# Patient Record
Sex: FEMALE | Race: WHITE | NOT HISPANIC OR LATINO | URBAN - METROPOLITAN AREA
[De-identification: names, ages, dates, MRNs, and addresses within clinical notes are randomized per-mention and may not be internally consistent; named-entity substitution may affect disease eponyms.]

---

## 2017-07-05 ENCOUNTER — APPOINTMENT (RX ONLY)
Dept: URBAN - METROPOLITAN AREA CLINIC 349 | Facility: CLINIC | Age: 52
Setting detail: DERMATOLOGY
End: 2017-07-05

## 2017-07-05 DIAGNOSIS — Q826 OTHER SPECIFIED ANOMALIES OF SKIN: ICD-10-CM

## 2017-07-05 DIAGNOSIS — Q819 OTHER SPECIFIED ANOMALIES OF SKIN: ICD-10-CM

## 2017-07-05 DIAGNOSIS — L82.1 OTHER SEBORRHEIC KERATOSIS: ICD-10-CM

## 2017-07-05 DIAGNOSIS — Q828 OTHER SPECIFIED ANOMALIES OF SKIN: ICD-10-CM

## 2017-07-05 DIAGNOSIS — L81.4 OTHER MELANIN HYPERPIGMENTATION: ICD-10-CM

## 2017-07-05 DIAGNOSIS — D22 MELANOCYTIC NEVI: ICD-10-CM

## 2017-07-05 DIAGNOSIS — L23.9 ALLERGIC CONTACT DERMATITIS, UNSPECIFIED CAUSE: ICD-10-CM

## 2017-07-05 PROBLEM — L85.3 XEROSIS CUTIS: Status: ACTIVE | Noted: 2017-07-05

## 2017-07-05 PROBLEM — E78.5 HYPERLIPIDEMIA, UNSPECIFIED: Status: ACTIVE | Noted: 2017-07-05

## 2017-07-05 PROBLEM — J30.1 ALLERGIC RHINITIS DUE TO POLLEN: Status: ACTIVE | Noted: 2017-07-05

## 2017-07-05 PROBLEM — L23.7 ALLERGIC CONTACT DERMATITIS DUE TO PLANTS, EXCEPT FOOD: Status: ACTIVE | Noted: 2017-07-05

## 2017-07-05 PROBLEM — Q82.8 OTHER SPECIFIED CONGENITAL MALFORMATIONS OF SKIN: Status: ACTIVE | Noted: 2017-07-05

## 2017-07-05 PROBLEM — D22.62 MELANOCYTIC NEVI OF LEFT UPPER LIMB, INCLUDING SHOULDER: Status: ACTIVE | Noted: 2017-07-05

## 2017-07-05 PROCEDURE — ? COUNSELING

## 2017-07-05 PROCEDURE — ? PRESCRIPTION

## 2017-07-05 PROCEDURE — 99242 OFF/OP CONSLTJ NEW/EST SF 20: CPT

## 2017-07-05 RX ORDER — FLUTICASONE PROPIONATE 0.05 %
CREAM (GRAM) TOPICAL
Qty: 1 | Refills: 3 | Status: ERX | COMMUNITY
Start: 2017-07-05

## 2017-07-05 RX ADMIN — Medication: at 00:00

## 2017-07-05 ASSESSMENT — LOCATION ZONE DERM
LOCATION ZONE: ARM
LOCATION ZONE: HAND
LOCATION ZONE: LEG
LOCATION ZONE: FINGER

## 2017-07-05 ASSESSMENT — LOCATION SIMPLE DESCRIPTION DERM
LOCATION SIMPLE: LEFT POSTERIOR UPPER ARM
LOCATION SIMPLE: LEFT UPPER ARM
LOCATION SIMPLE: RIGHT PRETIBIAL REGION
LOCATION SIMPLE: LEFT MIDDLE FINGER
LOCATION SIMPLE: LEFT HAND
LOCATION SIMPLE: RIGHT SHOULDER
LOCATION SIMPLE: RIGHT HAND

## 2017-07-05 ASSESSMENT — LOCATION DETAILED DESCRIPTION DERM
LOCATION DETAILED: LEFT ANTERIOR PROXIMAL UPPER ARM
LOCATION DETAILED: RIGHT RADIAL DORSAL HAND
LOCATION DETAILED: RIGHT LATERAL PROXIMAL PRETIBIAL REGION
LOCATION DETAILED: LEFT RADIAL DORSAL HAND
LOCATION DETAILED: LEFT PROXIMAL POSTERIOR UPPER ARM
LOCATION DETAILED: LEFT PROXIMAL DORSAL MIDDLE FINGER
LOCATION DETAILED: RIGHT POSTERIOR SHOULDER

## 2017-07-22 ENCOUNTER — HOSPITAL ENCOUNTER (OUTPATIENT)
Dept: MAMMOGRAPHY | Age: 52
Discharge: HOME | End: 2017-07-22
Attending: FAMILY MEDICINE
Payer: COMMERCIAL

## 2017-07-22 DIAGNOSIS — Z12.31 VISIT FOR SCREENING MAMMOGRAM: ICD-10-CM

## 2017-07-22 PROCEDURE — 77067 SCR MAMMO BI INCL CAD: CPT

## 2018-03-30 ENCOUNTER — HOSPITAL ENCOUNTER (OUTPATIENT)
Dept: PHYSICAL THERAPY | Age: 53
Discharge: HOME | End: 2018-03-30
Attending: FAMILY MEDICINE
Payer: COMMERCIAL

## 2018-03-30 DIAGNOSIS — G57.02 SCIATIC NEUROPATHY, LEFT: ICD-10-CM

## 2018-03-30 DIAGNOSIS — M54.2 NECK PAIN: ICD-10-CM

## 2018-03-30 PROCEDURE — 97140 MANUAL THERAPY 1/> REGIONS: CPT

## 2018-03-30 PROCEDURE — 97161 PT EVAL LOW COMPLEX 20 MIN: CPT

## 2018-03-30 NOTE — PROGRESS NOTES
Ambulatory/Rehab Services H2 Model Falls Risk Assessment    Risk Factor Pts. ·   Confusion/Disorientation/Impulsivity  []    4 ·   Symptomatic Depression  []   2 ·   Altered Elimination  []   1 ·   Dizziness/Vertigo  []   1 ·   Gender (Male)  []   1 ·   Any administered antiepileptics (anticonvulsants):  []   2 ·   Any administered benzodiazepines:  []   1 ·   Visual Impairment (specify):  []   1 ·   Portable Oxygen Use  []   1 ·   Orthostatic ? BP  []   1 ·   History of Recent Falls (within 3 mos.)  []   5     Ability to Rise from Chair (choose one) Pts. ·   Ability to rise in a single movement  []   0 ·   Pushes up, successful in one attempt  [x]   1 ·   Multiple attempts, but successful  []   3 ·   Unable to rise without assistance  []   4   Total: (5 or greater = High Risk) 1     Falls Prevention Plan:   []                Physical Limitations to Exercise (specify):   []                Mobility Assistance Device (type):   []                Exercise/Equipment Adaptation (specify):    ©2010 [facility] [address] Patent #[id number].  Federal Law prohibits the replication, distribution or use without written permission from [facility] [employer]

## 2018-03-30 NOTE — THERAPY EVALUATION
Donald Pace  : 1965  Payor: Mami Baker / [place]: [place] BLUE CR[place] OF [address] / Product Type: PPO /    [address] [facility], [address], [place], [id number] [place] [address] Road  [phone]   [phone]         OUTPATIENT PHYSICAL THERAPY:Initial Assessment 3/30/2018      ICD-10: Treatment Diagnosis: Pain in left hip (M25.552); difficulty walking (R26.2)  Left shoulder pain (M25.512)    Precautions/Allergies:   Codeine and Percocet [oxycodone-acetaminophen]   Fall Risk Score: 1 (? 5 = High Risk)  MD Orders: evaluate and treat MEDICAL/REFERRING DIAGNOSIS:  Sciatic neuropathy, left [G57.02]  Neck pain [M54.2]   DATE OF ONSET: 2018  REFERRING PHYSICIAN: Patrick Kelly MD  RETURN PHYSICIAN APPOINTMENT: 18     INITIAL ASSESSMENT:  Ms. Susanne Hollis presents with functional limitations due to left shoulder and hip pain. PT evaluation completed today revealing signs and symptoms consistent with left shoulder impingement, muscle imbalances of upper and lower extremities causing hip and shoulder restriction, as well as leg length discrepancy. Pt would highly benefit from skilled PT to reduce pain and improve overall functional mobility     PROBLEM LIST (Impacting functional limitations):  1. Decreased Strength  2. Decreased ADL/Functional Activities  3. Decreased Ambulation Ability/Technique  4. Increased Pain  5. Decreased Flexibility/Joint Mobility  6. Decreased Knowledge of Precautions  7. Decreased [place] [address] with Home Exercise Program INTERVENTIONS PLANNED:  1. Cold  2. Gait Training  3. Home Exercise Program (HEP)  4. Manual Therapy  5. Neuromuscular Re-education/Strengthening  6. Range of Motion (ROM)  7. Therapeutic Exercise/Strengthening     TREATMENT PLAN:  Effective Dates: 3/30/2018 TO 2018 (90 days).  Frequency/Duration: 2 times a week for 60 Days    GOALS: (Goals have been discussed and agreed upon with patient.)  SHORT-TERM FUNCTIONAL GOALS: Time Frame: 2-4 weeks   1. Pt will be independent with HEP. 2. Pt will report sleep is no longer disrupted by pain of hip or shoulder on regular basis. 3. Pt will report pain of left shoulder is no longer constant. 4. Pt will complete functional outcome measure. DISCHARGE GOALS: Time Frame: 6-8 weeks   1. Pt will improve LEFS/DASH score by at least 10 points. 2. Pt will report ability to walk 30 + minutes without limitation of left hip pain. Rehabilitation Potential For Stated Goals: Good  Regarding Leonardo Dugan's therapy, I certify that the treatment plan above will be carried out by a therapist or under their direction. Thank you for this referral,  Jarad Reed, PT     Referring Physician Signature: Smita Burks MD              Date                    The information in this section was collected on 3/30/18 (except where otherwise noted). HISTORY:   History of Present Injury/Illness (Reason for Referral):  Pt states that she was reaching out of car window 3 months ago to put a package in mailbox and felt intense sharp pain left left shoulder into neck. Swelling followed but resolved quickly. She denies numbness, tingling or bruising. She states since incident, her pain has localized to lateral shoulder but is constant and disrupting sleep. No imaging at this time. Pt also reports chronic off and on right hip pain, previously treated by PT in past with good results. Recently, pain has been returning and currently also reports left ankle feels a numb sensation with hip pain increase, but no pain of thigh or knee. She states a niraj injury of left ankle several years ago, leaving decreased mobility. Present symptoms (on day of initial evaluation): constant left lateral shoulder pain localized.  popping and decreased ROM in IR    Lateral left hip pain leading to Numbness of left ankle  · Aggravating factors: (shoulder)sleeping position, rolling on left side                                          (hip) sitting prolonged, sit stand, prolonged walking (has ankle injury-old with restriction)        · Relieving factors: prednisone, avoiding use of arm and hip     · Pain level: 3/10 present, 6/10 worst, 3/10 best      Past Medical History/Comorbidities:   Ms. Susanne Hollis  has a past medical history of Depression; HTN (hypertension)   Low Risk (1/8/2016); Low serum vitamin D; Migraines; and Mixed hyperlipidemia (1/8/2016). Ms. Susanne Hollis  has a past surgical history that includes hx orthopaedic; hx gyn; and hx colonoscopy. Social History/Living Environment:     lives with  2 story home   Prior Level of Function/Work/Activity:  Retired, walking in neighborhood, therapeutic riding, gardening   Dominant Side:         RIGHT  Other Clinical Tests:          none  Previous Treatment Approaches:          PT for hip pain   Current Medications:       Current Outpatient Prescriptions:     predniSONE (STERAPRED) 5 mg dose pack, See administration instruction per 5mg dose pack, Disp: 21 Tab, Rfl: 0    pramoxine-calamine (CALADRYL) 1-8 % lotion, Apply  to affected area three (3) times daily. , Disp: 180 mL, Rfl: 0    augmented betamethasone dipropionate (DIPROLENE-AF) 0.05 % ointment, Apply  to affected area two (2) times a day., Disp: 15 g, Rfl: 0    tavaborole (KERYDIN) 5 % moise, 1 Drop by Apply Externally route daily for 180 days. , Disp: 1 Bottle, Rfl: 2    montelukast (SINGULAIR) 10 mg tablet, Take 1 Tab by mouth daily for 180 days. , Disp: 90 Tab, Rfl: 1    ezetimibe-simvastatin (VYTORIN) 10-40 mg per tablet, Take 1 Tab by mouth nightly., Disp: 90 Tab, Rfl: 3    rizatriptan (MAXALT) 10 mg tablet, Take 1 Tab by mouth once as needed for Migraine. May repeat in 2 hours if needed  Indications: MIGRAINE, Disp: 6 Tab, Rfl: 1    estradiol (ESTRACE) 1 mg tablet, Take 1 Tab by mouth daily. , Disp: 90 Tab, Rfl: 3    EPINEPHrine (EPIPEN) 0.3 mg/0.3 mL injection, 0.3 mL by IntraMUSCular route once as needed for up to 1 dose., Disp: 3 Syringe, Rfl: 1    calcium citrate-vitamin D3 (CITRACAL WITH VITAMIN D MAXIMUM) tablet, Take  by mouth two (2) times a day., Disp: , Rfl:     ibuprofen (MOTRIN) 800 mg tablet, Take  by mouth., Disp: , Rfl:     omega-3 fatty acids-vitamin e (FISH OIL) 1,000 mg cap, Take 1 Cap by mouth., Disp: , Rfl:     coenzyme q10 (CO Q-10) 10 mg cap, Take  by mouth., Disp: , Rfl:     aspirin 81 mg chewable tablet, Take 81 mg by mouth daily. , Disp: , Rfl:    Date Last Reviewed:  3/30/2018     Number of Personal Factors/Comorbidities that affect the Plan of Care: 0: LOW COMPLEXITY   EXAMINATION:   Observation/Orthostatic Postural Assessment:    · Decreased lordotic curvature        · Elevated left iliac crest   · Left LE shorter than right (supine)    Palpation:          Point tenderness of lateral and anterior deltoid left shoulder   ROM:            Lumbar spine Date:  3/30/18 Date:     Direction  Parameters Parameters   Flexion  100%    Extension  100%    Rotation  R: 75%  L: 75%    Side bending  R: 75%  L: 75%    Hip IR R: WNL  L: restricted     Hip ER R: WNL  L: WNL           Shoulder ROM  DATE  3/10/18 DATE     flexion R: WNL  L: mild limitation cs R:   L:    abd R: WNL  L: WNL cs R:   L:    External rotation  R:  WNL  L: WNL R:   L:    Internal rotation R: WNL  L: restricted cs  R:   L:        Strength:           Shoulder ROM  DATE  3/30/18 DATE     flexion R: 5/5   L: 4+  R:   L:    abd R: 5  L: 4+ R:   L:    External rotation  R: 5  L: 5 R:   L:    Internal rotation R: 5  L: 5 R:   L:    Elbow flex/ext R: 5  L: 5 R:   L:                Lower quadrant    DATE  3/30/18 DATE     Hip flexion R: 5/5  L: 4+/5 R:   L:    Hip Abduction  R: 5/5  L: 5/5 R:   L:    Hip Extension  R: 5/5  L: 5/5  R:   L:    Knee Flexion  R: 5/5  L: 5/5 R:   L:    Knee Extension  R: 5/5  L: 5/5 R:   L:    Ankle Dorsiflexion  R: 5/5  L:4+/5 R:   L:   Ankle Plantarflexion  R: 5/5  L: 4+/5 R:  L:          Special Tests:          (-) Sciatic tension B   Neurological Screen:        Normal   Functional Mobility:         Gait/Ambulation: slightly asymmetrical with left hip in ER        Transfers:  Sit to stand without UE        Bed Mobility:  Normal         Stairs:  Not assessed    Body Structures Involved:  1. Joints  2. Muscles  3. Ligaments Body Functions Affected:  1. Sensory/Pain  2. Neuromusculoskeletal  3. Movement Related Activities and Participation Affected:  1. General Tasks and Demands  2. Mobility  3. Self Care   Number of elements that affect the Plan of Care: 3: MODERATE COMPLEXITY   CLINICAL PRESENTATION:   Presentation: Evolving clinical presentation with changing clinical characteristics: MODERATE COMPLEXITY   CLINICAL DECISION MAKING: ·   Patient is expected to demonstrate progress in strength and range of motion to increase independence with ADLs and recreational activities . Reason for Services/Other Comments:  · Patient continues to require modification of therapeutic interventions to increase complexity of exercises. Use of outcome tool(s) and clinical judgement create a POC that gives a: Clear prediction of patient's progress: LOW COMPLEXITY            TREATMENT:   (In addition to Assessment/Re-Assessment sessions the following treatments were rendered)  Pre-treatment Symptoms/Complaints:  See above   Pain: Initial:     3/10 shoulder Post Session:  3/10 shoulder       Date:  3/30/18 Date:   Date:     Activity/Exercise Parameters Parameters Parameters   Education  POC                                            Manual x 10 min   · Inf and AP mobs grade IV-- left gh joint   · Long distraction with oscillation       Treatment/Session Assessment:    · Response to Treatment:  Good understanding of POC. · Compliance with Program/Exercises: Will assess as treatment progresses.   · Recommendations/Intent for next treatment session: \"manual hip flexor release, gastroc stretching, scap strengthen, manual mobs to decrease impingment, teach quin stretch      Future Appointments  Date Time Provider Liana Bates   4/2/2018 9:30 AM Sanket Trevino, PT [facility]   4/5/2018 8:45 AM Corean Belinda, PT [facility]   4/9/2018 9:30 AM Coregiancarlo Belinda, PT [facility]   4/13/2018 8:45 AM Coregiancarlo Trevino, PT [facility]   4/16/2018 8:45 AM Coregiancarlo Trevino, PT [facility]   4/19/2018 8:45 AM Coregiancarlo Trevino, PT [facility]   4/23/2018 8:00 AM Sanket Trevino, PT [facility]   4/27/2018 8:00 AM Corean Belinda, PT [facility]   5/25/2018 8:00 AM Raissa Silverman MD [facility]       Total Treatment Duration: 10 min treatment, 50 min evaluation    PT Patient Time In/Time Out  Time In: 0940  Time Out: [address], PT

## 2018-04-02 ENCOUNTER — HOSPITAL ENCOUNTER (OUTPATIENT)
Dept: PHYSICAL THERAPY | Age: 53
Discharge: HOME | End: 2018-04-02
Attending: FAMILY MEDICINE
Payer: COMMERCIAL

## 2018-04-02 PROCEDURE — 97140 MANUAL THERAPY 1/> REGIONS: CPT

## 2018-04-02 PROCEDURE — 97110 THERAPEUTIC EXERCISES: CPT

## 2018-04-02 NOTE — PROGRESS NOTES
Brandy Allison  : 1965  Payor: Hadley Epley / [place] OF [address] / Product Type: PPO /    [address] [facility], [address], [place], [id number] [place] [address] Road  [phone]   [phone]         OUTPATIENT PHYSICAL THERAPY:Daily Note 2018      ICD-10: Treatment Diagnosis: Pain in left hip (M25.552); difficulty walking (R26.2)  Left shoulder pain (M25.512)    Precautions/Allergies:   Codeine and Percocet [oxycodone-acetaminophen]   Fall Risk Score: 1 (? 5 = High Risk)  MD Orders: evaluate and treat MEDICAL/REFERRING DIAGNOSIS:  Sciatic neuropathy, left  Neck pain   DATE OF ONSET: 2018  REFERRING PHYSICIAN: Yecenia Denise MD  RETURN PHYSICIAN APPOINTMENT: 18     INITIAL ASSESSMENT:  Ms. Rose Marie Irving presents with functional limitations due to left shoulder and hip pain. PT evaluation completed today revealing signs and symptoms consistent with left shoulder impingement, muscle imbalances of upper and lower extremities causing hip and shoulder restriction, as well as leg length discrepancy. Pt would highly benefit from skilled PT to reduce pain and improve overall functional mobility     PROBLEM LIST (Impacting functional limitations):  1. Decreased Strength  2. Decreased ADL/Functional Activities  3. Decreased Ambulation Ability/Technique  4. Increased Pain  5. Decreased Flexibility/Joint Mobility  6. Decreased Knowledge of Precautions  7. Decreased [place] with Home Exercise Program INTERVENTIONS PLANNED:  1. Cold  2. Gait Training  3. Home Exercise Program (HEP)  4. Manual Therapy  5. Neuromuscular Re-education/Strengthening  6. Range of Motion (ROM)  7. Therapeutic Exercise/Strengthening     TREATMENT PLAN:  Effective Dates: 3/30/2018 TO 2018 (90 days).  Frequency/Duration: 2 times a week for 60 Days    GOALS: (Goals have been discussed and agreed upon with patient.)  SHORT-TERM FUNCTIONAL GOALS: Time Frame: 2-4 weeks 1. Pt will be independent with HEP. 2. Pt will report sleep is no longer disrupted by pain of hip or shoulder on regular basis. 3. Pt will report pain of left shoulder is no longer constant. 4. Pt will complete functional outcome measure. DISCHARGE GOALS: Time Frame: 6-8 weeks   1. Pt will improve LEFS/DASH score by at least 10 points. 2. Pt will report ability to walk 30 + minutes without limitation of left hip pain. Rehabilitation Potential For Stated Goals: Good              The information in this section was collected on 3/30/18 (except where otherwise noted). HISTORY:   History of Present Injury/Illness (Reason for Referral):  Pt states that she was reaching out of car window 3 months ago to put a package in mailbox and felt intense sharp pain left left shoulder into neck. Swelling followed but resolved quickly. She denies numbness, tingling or bruising. She states since incident, her pain has localized to lateral shoulder but is constant and disrupting sleep. No imaging at this time. Pt also reports chronic off and on right hip pain, previously treated by PT in past with good results. Recently, pain has been returning and currently also reports left ankle feels a numb sensation with hip pain increase, but no pain of thigh or knee. She states a niraj injury of left ankle several years ago, leaving decreased mobility. Present symptoms (on day of initial evaluation): constant left lateral shoulder pain localized.  popping and decreased ROM in IR    Lateral left hip pain leading to Numbness of left ankle  · Aggravating factors: (shoulder)sleeping position, rolling on left side                                          (hip) sitting prolonged, sit stand, prolonged walking (has ankle injury-old with restriction)        · Relieving factors: prednisone, avoiding use of arm and hip     · Pain level: 3/10 present, 6/10 worst, 3/10 best      Past Medical History/Comorbidities: Ms. Betsy Rowe  has a past medical history of Depression; HTN (hypertension)   Low Risk (1/8/2016); Low serum vitamin D; Migraines; and Mixed hyperlipidemia (1/8/2016). Ms. Betsy Rowe  has a past surgical history that includes hx orthopaedic; hx gyn; and hx colonoscopy. Social History/Living Environment:     lives with  2 story home   Prior Level of Function/Work/Activity:  Retired, walking in neighborhood, therapeutic riding, gardening   Dominant Side:         RIGHT  Other Clinical Tests:          none  Previous Treatment Approaches:          PT for hip pain   Current Medications:       Current Outpatient Prescriptions:     predniSONE (STERAPRED) 5 mg dose pack, See administration instruction per 5mg dose pack, Disp: 21 Tab, Rfl: 0    pramoxine-calamine (CALADRYL) 1-8 % lotion, Apply  to affected area three (3) times daily. , Disp: 180 mL, Rfl: 0    augmented betamethasone dipropionate (DIPROLENE-AF) 0.05 % ointment, Apply  to affected area two (2) times a day., Disp: 15 g, Rfl: 0    tavaborole (KERYDIN) 5 % moise, 1 Drop by Apply Externally route daily for 180 days. , Disp: 1 Bottle, Rfl: 2    montelukast (SINGULAIR) 10 mg tablet, Take 1 Tab by mouth daily for 180 days. , Disp: 90 Tab, Rfl: 1    ezetimibe-simvastatin (VYTORIN) 10-40 mg per tablet, Take 1 Tab by mouth nightly., Disp: 90 Tab, Rfl: 3    rizatriptan (MAXALT) 10 mg tablet, Take 1 Tab by mouth once as needed for Migraine. May repeat in 2 hours if needed  Indications: MIGRAINE, Disp: 6 Tab, Rfl: 1    estradiol (ESTRACE) 1 mg tablet, Take 1 Tab by mouth daily. , Disp: 90 Tab, Rfl: 3    EPINEPHrine (EPIPEN) 0.3 mg/0.3 mL injection, 0.3 mL by IntraMUSCular route once as needed for up to 1 dose., Disp: 3 Syringe, Rfl: 1    calcium citrate-vitamin D3 (CITRACAL WITH VITAMIN D MAXIMUM) tablet, Take  by mouth two (2) times a day., Disp: , Rfl:     ibuprofen (MOTRIN) 800 mg tablet, Take  by mouth., Disp: , Rfl:     omega-3 fatty acids-vitamin e (FISH OIL) 1,000 mg cap, Take 1 Cap by mouth., Disp: , Rfl:     coenzyme q10 (CO Q-10) 10 mg cap, Take  by mouth., Disp: , Rfl:     aspirin 81 mg chewable tablet, Take 81 mg by mouth daily. , Disp: , Rfl:    Date Last Reviewed:  4/2/2018     EXAMINATION:   Observation/Orthostatic Postural Assessment:    · Decreased lordotic curvature        · Elevated left iliac crest   · Left LE shorter than right (supine)    Palpation:          Point tenderness of lateral and anterior deltoid left shoulder   ROM:            Lumbar spine Date:  3/30/18 Date:     Direction  Parameters Parameters   Flexion  100%    Extension  100%    Rotation  R: 75%  L: 75%    Side bending  R: 75%  L: 75%    Hip IR R: WNL  L: restricted     Hip ER R: WNL  L: WNL           Shoulder ROM  DATE  3/10/18 DATE     flexion R: WNL  L: mild limitation cs R:   L:    abd R: WNL  L: WNL cs R:   L:    External rotation  R:  WNL  L: WNL R:   L:    Internal rotation R: WNL  L: restricted cs  R:   L:        Strength:           Shoulder ROM  DATE  3/30/18 DATE     flexion R: 5/5   L: 4+  R:   L:    abd R: 5  L: 4+ R:   L:    External rotation  R: 5  L: 5 R:   L:    Internal rotation R: 5  L: 5 R:   L:    Elbow flex/ext R: 5  L: 5 R:   L:                Lower quadrant    DATE  3/30/18 DATE     Hip flexion R: 5/5  L: 4+/5 R:   L:    Hip Abduction  R: 5/5  L: 5/5 R:   L:    Hip Extension  R: 5/5  L: 5/5  R:   L:    Knee Flexion  R: 5/5  L: 5/5 R:   L:    Knee Extension  R: 5/5  L: 5/5 R:   L:    Ankle Dorsiflexion  R: 5/5  L:4+/5 R:   L:   Ankle Plantarflexion  R: 5/5  L: 4+/5 R:  L:          Special Tests:          (-) Sciatic tension B   Neurological Screen:        Normal   Functional Mobility:         Gait/Ambulation: slightly asymmetrical with left hip in ER        Transfers:  Sit to stand without UE        Bed Mobility:  Normal         Stairs:  Not assessed  ·   Patient is expected to demonstrate progress in strength and range of motion to increase independence with ADLs and recreational activities . Reason for Services/Other Comments:  · Patient continues to require modification of therapeutic interventions to increase complexity of exercises. TREATMENT:   (In addition to Assessment/Re-Assessment sessions the following treatments were rendered)  Pre-treatment Symptoms/Complaints:  Pt states that she was not very sore after evaluation. Pain: Initial:     5/10 left hip, 3/10 shoulder  Post Session:  No report of pain at end of session. Therapeutic Exercise: ( 20 min ):  Exercises per grid below to improve mobility and strength. Required moderate visual, verbal and manual cues to promote proper body alignment, promote proper body posture and promote proper body mechanics. Progressed resistance, range and repetitions as indicated. Date:  3/30/18 Date:  4/2/18 Date:     Activity/Exercise Parameters Parameters Parameters   Education  POC     Gerardo stretch   3 x 20 sec (supine)    gastroc stretch   3 x 20 sec     nustep   X 10 min level 2     t-band ER/IR  NV    t-band extension   NV    t-band rows   NV       Manual x 25 min   · Inf and AP mobs grade IV-- left gh joint   · Long distraction with oscillation   · Grade IV- quadrant   · Deep MFR to left lateral deltoid, and biceps long head  · iliopsoas release left hip    Modalities:  Ice x 10 min left shoulder     Treatment/Session Assessment:    · Response to Treatment:  Good understanding new HEP (gave handout for home). No report of pain at end of session    · Compliance with Program/Exercises: Will assess as treatment progresses.   · Recommendations/Intent for next treatment session: manual hip flexor release, gastroc stretching, scap strengthen, manual mobs to decrease impingment, teach gerardo stretch      Future Appointments  Date Time Provider Liana Bates   4/5/2018 8:45 AM Idris Gonzalez PT [facility]   4/9/2018 9:30 AM Idris Gonzalez PT [facility]   4/13/2018 8:45 AM Leopold Curd Lamine, PT [facility] [place] [facility]   4/16/2018 8:45 AM Melvin File, PT [facility]   4/19/2018 8:45 AM Melvin File, PT [facility]   4/23/2018 8:00 AM Melvin File, PT [facility]   4/27/2018 8:00 AM Melvin File, PT [facility]   5/25/2018 8:00 AM Nils Andersen MD [facility]       Total Treatment Duration: 55 min     PT Patient Time In/Time Out  Time In: 0930  Time Out: [address], PT

## 2018-04-05 ENCOUNTER — HOSPITAL ENCOUNTER (OUTPATIENT)
Dept: PHYSICAL THERAPY | Age: 53
Discharge: HOME | End: 2018-04-05
Attending: FAMILY MEDICINE
Payer: COMMERCIAL

## 2018-04-05 PROCEDURE — 97110 THERAPEUTIC EXERCISES: CPT

## 2018-04-05 PROCEDURE — 97140 MANUAL THERAPY 1/> REGIONS: CPT

## 2018-04-05 NOTE — PROGRESS NOTES
Dhruv Plunkett  : 1965  Payor: Romeo Saravia / Plan: [place] BLUE CR[place] OF [address] / Product Type: PPO /    [address]. [facility], [address], [place], [id number] [place] [address] Road  [phone]   [phone]         OUTPATIENT PHYSICAL THERAPY:Daily Note 2018      ICD-10: Treatment Diagnosis: Pain in left hip (M25.552); difficulty walking (R26.2)  Left shoulder pain (M25.512)    Precautions/Allergies:   Codeine and Percocet [oxycodone-acetaminophen]   Fall Risk Score: 1 (? 5 = High Risk)  MD Orders: evaluate and treat MEDICAL/REFERRING DIAGNOSIS:  Sciatic neuropathy, left  Neck pain   DATE OF ONSET: 2018  REFERRING PHYSICIAN: Maikel Alston MD  RETURN PHYSICIAN APPOINTMENT: 18     INITIAL ASSESSMENT:  Ms. Yenny Ashraf presents with functional limitations due to left shoulder and hip pain. PT evaluation completed today revealing signs and symptoms consistent with left shoulder impingement, muscle imbalances of upper and lower extremities causing hip and shoulder restriction, as well as leg length discrepancy. Pt would highly benefit from skilled PT to reduce pain and improve overall functional mobility     PROBLEM LIST (Impacting functional limitations):  1. Decreased Strength  2. Decreased ADL/Functional Activities  3. Decreased Ambulation Ability/Technique  4. Increased Pain  5. Decreased Flexibility/Joint Mobility  6. Decreased Knowledge of Precautions  7. Decreased [place] with Home Exercise Program INTERVENTIONS PLANNED:  1. Cold  2. Gait Training  3. Home Exercise Program (HEP)  4. Manual Therapy  5. Neuromuscular Re-education/Strengthening  6. Range of Motion (ROM)  7. Therapeutic Exercise/Strengthening     TREATMENT PLAN:  Effective Dates: 3/30/2018 TO 2018 (90 days).  Frequency/Duration: 2 times a week for 60 Days    GOALS: (Goals have been discussed and agreed upon with patient.)  SHORT-TERM FUNCTIONAL GOALS: Time Frame: 2-4 weeks 1. Pt will be independent with HEP. 2. Pt will report sleep is no longer disrupted by pain of hip or shoulder on regular basis. 3. Pt will report pain of left shoulder is no longer constant. 4. Pt will complete functional outcome measure. DISCHARGE GOALS: Time Frame: 6-8 weeks   1. Pt will improve LEFS/DASH score by at least 10 points. 2. Pt will report ability to walk 30 + minutes without limitation of left hip pain. Rehabilitation Potential For Stated Goals: Good              The information in this section was collected on 3/30/18 (except where otherwise noted). HISTORY:   History of Present Injury/Illness (Reason for Referral):  Pt states that she was reaching out of car window 3 months ago to put a package in mailbox and felt intense sharp pain left left shoulder into neck. Swelling followed but resolved quickly. She denies numbness, tingling or bruising. She states since incident, her pain has localized to lateral shoulder but is constant and disrupting sleep. No imaging at this time. Pt also reports chronic off and on right hip pain, previously treated by PT in past with good results. Recently, pain has been returning and currently also reports left ankle feels a numb sensation with hip pain increase, but no pain of thigh or knee. She states a niraj injury of left ankle several years ago, leaving decreased mobility. Present symptoms (on day of initial evaluation): constant left lateral shoulder pain localized.  popping and decreased ROM in IR    Lateral left hip pain leading to Numbness of left ankle  · Aggravating factors: (shoulder)sleeping position, rolling on left side                                          (hip) sitting prolonged, sit stand, prolonged walking (has ankle injury-old with restriction)        · Relieving factors: prednisone, avoiding use of arm and hip     · Pain level: 3/10 present, 6/10 worst, 3/10 best      Past Medical History/Comorbidities: Ms. Magdalene Bowling  has a past medical history of Depression; HTN (hypertension)   Low Risk (1/8/2016); Low serum vitamin D; Migraines; and Mixed hyperlipidemia (1/8/2016). Ms. Magdalene Bowling  has a past surgical history that includes hx orthopaedic; hx gyn; and hx colonoscopy. Social History/Living Environment:     lives with  2 story home   Prior Level of Function/Work/Activity:  Retired, walking in neighborhood, therapeutic riding, gardening   Dominant Side:         RIGHT  Other Clinical Tests:          none  Previous Treatment Approaches:          PT for hip pain   Current Medications:       Current Outpatient Prescriptions:     predniSONE (STERAPRED) 5 mg dose pack, See administration instruction per 5mg dose pack, Disp: 21 Tab, Rfl: 0    pramoxine-calamine (CALADRYL) 1-8 % lotion, Apply  to affected area three (3) times daily. , Disp: 180 mL, Rfl: 0    augmented betamethasone dipropionate (DIPROLENE-AF) 0.05 % ointment, Apply  to affected area two (2) times a day., Disp: 15 g, Rfl: 0    tavaborole (KERYDIN) 5 % moise, 1 Drop by Apply Externally route daily for 180 days. , Disp: 1 Bottle, Rfl: 2    montelukast (SINGULAIR) 10 mg tablet, Take 1 Tab by mouth daily for 180 days. , Disp: 90 Tab, Rfl: 1    ezetimibe-simvastatin (VYTORIN) 10-40 mg per tablet, Take 1 Tab by mouth nightly., Disp: 90 Tab, Rfl: 3    rizatriptan (MAXALT) 10 mg tablet, Take 1 Tab by mouth once as needed for Migraine. May repeat in 2 hours if needed  Indications: MIGRAINE, Disp: 6 Tab, Rfl: 1    estradiol (ESTRACE) 1 mg tablet, Take 1 Tab by mouth daily. , Disp: 90 Tab, Rfl: 3    EPINEPHrine (EPIPEN) 0.3 mg/0.3 mL injection, 0.3 mL by IntraMUSCular route once as needed for up to 1 dose., Disp: 3 Syringe, Rfl: 1    calcium citrate-vitamin D3 (CITRACAL WITH VITAMIN D MAXIMUM) tablet, Take  by mouth two (2) times a day., Disp: , Rfl:     ibuprofen (MOTRIN) 800 mg tablet, Take  by mouth., Disp: , Rfl:     omega-3 fatty acids-vitamin e (FISH OIL) 1,000 mg cap, Take 1 Cap by mouth., Disp: , Rfl:     coenzyme q10 (CO Q-10) 10 mg cap, Take  by mouth., Disp: , Rfl:     aspirin 81 mg chewable tablet, Take 81 mg by mouth daily. , Disp: , Rfl:    Date Last Reviewed:  4/5/2018     EXAMINATION:   Observation/Orthostatic Postural Assessment:    · Decreased lordotic curvature        · Elevated left iliac crest   · Left LE shorter than right (supine)    Palpation:          Point tenderness of lateral and anterior deltoid left shoulder   ROM:            Lumbar spine Date:  3/30/18 Date:     Direction  Parameters Parameters   Flexion  100%    Extension  100%    Rotation  R: 75%  L: 75%    Side bending  R: 75%  L: 75%    Hip IR R: WNL  L: restricted     Hip ER R: WNL  L: WNL           Shoulder ROM  DATE  3/10/18 DATE     flexion R: WNL  L: mild limitation cs R:   L:    abd R: WNL  L: WNL cs R:   L:    External rotation  R:  WNL  L: WNL R:   L:    Internal rotation R: WNL  L: restricted cs  R:   L:        Strength:           Shoulder ROM  DATE  3/30/18 DATE     flexion R: 5/5   L: 4+  R:   L:    abd R: 5  L: 4+ R:   L:    External rotation  R: 5  L: 5 R:   L:    Internal rotation R: 5  L: 5 R:   L:    Elbow flex/ext R: 5  L: 5 R:   L:                Lower quadrant    DATE  3/30/18 DATE     Hip flexion R: 5/5  L: 4+/5 R:   L:    Hip Abduction  R: 5/5  L: 5/5 R:   L:    Hip Extension  R: 5/5  L: 5/5  R:   L:    Knee Flexion  R: 5/5  L: 5/5 R:   L:    Knee Extension  R: 5/5  L: 5/5 R:   L:    Ankle Dorsiflexion  R: 5/5  L:4+/5 R:   L:   Ankle Plantarflexion  R: 5/5  L: 4+/5 R:  L:          Special Tests:          (-) Sciatic tension B   Neurological Screen:        Normal   Functional Mobility:         Gait/Ambulation: slightly asymmetrical with left hip in ER        Transfers:  Sit to stand without UE        Bed Mobility:  Normal         Stairs:  Not assessed  ·   Patient is expected to demonstrate progress in strength and range of motion to increase independence with ADLs and recreational activities . Reason for Services/Other Comments:  · Patient continues to require modification of therapeutic interventions to increase complexity of exercises. TREATMENT:   (In addition to Assessment/Re-Assessment sessions the following treatments were rendered)  Pre-treatment Symptoms/Complaints: left hip pain much better after last session. No pain this AM. doing her HEP at home   Pain: Initial:     0/10 left hip, 3/10 shoulder  Post Session:  3/10     Therapeutic Exercise: ( 30 min ):  Exercises per grid below to improve mobility and strength. Required moderate visual, verbal and manual cues to promote proper body alignment, promote proper body posture and promote proper body mechanics. Progressed resistance, range and repetitions as indicated. Date:  3/30/18 Date:  4/2/18 Date:  4/5/18   Activity/Exercise Parameters Parameters Parameters   Education  POC  Discussed left shoulder mechanics with overhead reaching    Gerardo stretch   3 x 20 sec (supine) At end of mat x 2    gastroc stretch   3 x 20 sec     nustep   X 10 min level 2  X 10 min level 3    t-band ER/IR  NV RTB  x 10    t-band extension   NV RTB x 10    t-band rows   NV RTB x 10    Table walking (UE wbing)   5 x 3 steps             Manual x 25 min   · Inf and AP mobs grade IV-- left gh joint   · Long distraction with oscillation   · Grade IV- quadrant   · Deep MFR to left lateral deltoid, and biceps long head  · iliopsoas release left hip    Modalities:  Ice x 5 min left shoulder     Treatment/Session Assessment:    · Response to Treatment:  Good understanding new HEP for shoulder (gave handout for home). No increased pain with shoulder HEP today. · Compliance with Program/Exercises: Will assess as treatment progresses.   · Recommendations/Intent for next treatment session: manual hip flexor release, gastroc stretching, scap strengthen, manual mobs to decrease impingment, teach hip flexor stretch       Future Appointments  Date Time Provider Liana Bates   4/9/2018 9:30 AM Tyra Cm, PT [facility]   4/13/2018 8:45 AM [facility]   4/16/2018 8:45 AM [facility]   4/19/2018 8:45 AM [facility]   4/23/2018 8:00 AM [facility]   4/27/2018 8:00 AM Tyra Cm, PT [facility]   5/25/2018 8:00 AM Padmaja Caruso MD [facility]       Total Treatment Duration: 55 min     PT Patient Time In/Time Out  Time In: 0845  Time Out: [id number], PT

## 2018-04-09 ENCOUNTER — HOSPITAL ENCOUNTER (OUTPATIENT)
Dept: PHYSICAL THERAPY | Age: 53
Discharge: HOME | End: 2018-04-09
Attending: FAMILY MEDICINE
Payer: COMMERCIAL

## 2018-04-09 PROCEDURE — 97140 MANUAL THERAPY 1/> REGIONS: CPT

## 2018-04-09 PROCEDURE — 97110 THERAPEUTIC EXERCISES: CPT

## 2018-04-09 NOTE — PROGRESS NOTES
Silvia Avitia  : 1965  Payor: Brinda Garzon / [place]: [place] OF [address] [place] [address] Rd / Product Type: PPO /    [facility] [place] [address]. [address], [place], [address] [place] [address] Road  [phone]   [phone]         OUTPATIENT PHYSICAL THERAPY:Daily Note 2018      ICD-10: Treatment Diagnosis: Pain in left hip (M25.552); difficulty walking (R26.2)  Left shoulder pain (M25.512)    Precautions/Allergies:   Codeine and Percocet [oxycodone-acetaminophen]   Fall Risk Score: 1 (? 5 = High Risk)  MD Orders: evaluate and treat MEDICAL/REFERRING DIAGNOSIS:  Sciatic neuropathy, left  Neck pain   DATE OF ONSET: 2018  REFERRING PHYSICIAN: Henrik Rodriguez MD  RETURN PHYSICIAN APPOINTMENT: 18     INITIAL ASSESSMENT:  Ms. Zuleyma Riley presents with functional limitations due to left shoulder and hip pain. PT evaluation completed today revealing signs and symptoms consistent with left shoulder impingement, muscle imbalances of upper and lower extremities causing hip and shoulder restriction, as well as leg length discrepancy. Pt would highly benefit from skilled PT to reduce pain and improve overall functional mobility     PROBLEM LIST (Impacting functional limitations):  1. Decreased Strength  2. Decreased ADL/Functional Activities  3. Decreased Ambulation Ability/Technique  4. Increased Pain  5. Decreased Flexibility/Joint Mobility  6. Decreased Knowledge of Precautions  7. Decreased [place] with Home Exercise Program INTERVENTIONS PLANNED:  1. Cold  2. Gait Training  3. Home Exercise Program (HEP)  4. Manual Therapy  5. Neuromuscular Re-education/Strengthening  6. Range of Motion (ROM)  7. Therapeutic Exercise/Strengthening     TREATMENT PLAN:  Effective Dates: 3/30/2018 TO 2018 (90 days).  Frequency/Duration: 2 times a week for 60 Days    GOALS: (Goals have been discussed and agreed upon with patient.)  SHORT-TERM FUNCTIONAL GOALS: Time Frame: 2-4 weeks 1. Pt will be independent with HEP. 2. Pt will report sleep is no longer disrupted by pain of hip or shoulder on regular basis. 3. Pt will report pain of left shoulder is no longer constant. 4. Pt will complete functional outcome measure. DISCHARGE GOALS: Time Frame: 6-8 weeks   1. Pt will improve LEFS/DASH score by at least 10 points. 2. Pt will report ability to walk 30 + minutes without limitation of left hip pain. Rehabilitation Potential For Stated Goals: Good              The information in this section was collected on 3/30/18 (except where otherwise noted). HISTORY:   History of Present Injury/Illness (Reason for Referral):  Pt states that she was reaching out of car window 3 months ago to put a package in mailbox and felt intense sharp pain left left shoulder into neck. Swelling followed but resolved quickly. She denies numbness, tingling or bruising. She states since incident, her pain has localized to lateral shoulder but is constant and disrupting sleep. No imaging at this time. Pt also reports chronic off and on right hip pain, previously treated by PT in past with good results. Recently, pain has been returning and currently also reports left ankle feels a numb sensation with hip pain increase, but no pain of thigh or knee. She states a niraj injury of left ankle several years ago, leaving decreased mobility. Present symptoms (on day of initial evaluation): constant left lateral shoulder pain localized.  popping and decreased ROM in IR    Lateral left hip pain leading to Numbness of left ankle  · Aggravating factors: (shoulder)sleeping position, rolling on left side                                          (hip) sitting prolonged, sit stand, prolonged walking (has ankle injury-old with restriction)        · Relieving factors: prednisone, avoiding use of arm and hip     · Pain level: 3/10 present, 6/10 worst, 3/10 best      Past Medical History/Comorbidities: Ms. Carter Sorensen  has a past medical history of Depression; HTN (hypertension)   Low Risk (1/8/2016); Low serum vitamin D; Migraines; and Mixed hyperlipidemia (1/8/2016). Ms. Carter Sorensen  has a past surgical history that includes hx orthopaedic; hx gyn; and hx colonoscopy. Social History/Living Environment:     lives with  2 story home   Prior Level of Function/Work/Activity:  Retired, walking in neighborhood, therapeutic riding, gardening   Dominant Side:         RIGHT  Other Clinical Tests:          none  Previous Treatment Approaches:          PT for hip pain   Current Medications:       Current Outpatient Prescriptions:     predniSONE (STERAPRED) 5 mg dose pack, See administration instruction per 5mg dose pack, Disp: 21 Tab, Rfl: 0    pramoxine-calamine (CALADRYL) 1-8 % lotion, Apply  to affected area three (3) times daily. , Disp: 180 mL, Rfl: 0    augmented betamethasone dipropionate (DIPROLENE-AF) 0.05 % ointment, Apply  to affected area two (2) times a day., Disp: 15 g, Rfl: 0    tavaborole (KERYDIN) 5 % moise, 1 Drop by Apply Externally route daily for 180 days. , Disp: 1 Bottle, Rfl: 2    montelukast (SINGULAIR) 10 mg tablet, Take 1 Tab by mouth daily for 180 days. , Disp: 90 Tab, Rfl: 1    ezetimibe-simvastatin (VYTORIN) 10-40 mg per tablet, Take 1 Tab by mouth nightly., Disp: 90 Tab, Rfl: 3    rizatriptan (MAXALT) 10 mg tablet, Take 1 Tab by mouth once as needed for Migraine. May repeat in 2 hours if needed  Indications: MIGRAINE, Disp: 6 Tab, Rfl: 1    estradiol (ESTRACE) 1 mg tablet, Take 1 Tab by mouth daily. , Disp: 90 Tab, Rfl: 3    EPINEPHrine (EPIPEN) 0.3 mg/0.3 mL injection, 0.3 mL by IntraMUSCular route once as needed for up to 1 dose., Disp: 3 Syringe, Rfl: 1    calcium citrate-vitamin D3 (CITRACAL WITH VITAMIN D MAXIMUM) tablet, Take  by mouth two (2) times a day., Disp: , Rfl:     ibuprofen (MOTRIN) 800 mg tablet, Take  by mouth., Disp: , Rfl:     omega-3 fatty acids-vitamin e (FISH OIL) 1,000 mg cap, Take 1 Cap by mouth., Disp: , Rfl:     coenzyme q10 (CO Q-10) 10 mg cap, Take  by mouth., Disp: , Rfl:     aspirin 81 mg chewable tablet, Take 81 mg by mouth daily. , Disp: , Rfl:    Date Last Reviewed:  4/9/2018     EXAMINATION:   Observation/Orthostatic Postural Assessment:    · Decreased lordotic curvature        · Elevated left iliac crest   · Left LE shorter than right (supine)    Palpation:          Point tenderness of lateral and anterior deltoid left shoulder   ROM:            Lumbar spine Date:  3/30/18 Date:     Direction  Parameters Parameters   Flexion  100%    Extension  100%    Rotation  R: 75%  L: 75%    Side bending  R: 75%  L: 75%    Hip IR R: WNL  L: restricted     Hip ER R: WNL  L: WNL           Shoulder ROM  DATE  3/10/18 DATE     flexion R: WNL  L: mild limitation cs R:   L:    abd R: WNL  L: WNL cs R:   L:    External rotation  R:  WNL  L: WNL R:   L:    Internal rotation R: WNL  L: restricted cs  R:   L:        Strength:           Shoulder ROM  DATE  3/30/18 DATE     flexion R: 5/5   L: 4+  R:   L:    abd R: 5  L: 4+ R:   L:    External rotation  R: 5  L: 5 R:   L:    Internal rotation R: 5  L: 5 R:   L:    Elbow flex/ext R: 5  L: 5 R:   L:                Lower quadrant    DATE  3/30/18 DATE     Hip flexion R: 5/5  L: 4+/5 R:   L:    Hip Abduction  R: 5/5  L: 5/5 R:   L:    Hip Extension  R: 5/5  L: 5/5  R:   L:    Knee Flexion  R: 5/5  L: 5/5 R:   L:    Knee Extension  R: 5/5  L: 5/5 R:   L:    Ankle Dorsiflexion  R: 5/5  L:4+/5 R:   L:   Ankle Plantarflexion  R: 5/5  L: 4+/5 R:  L:          Special Tests:          (-) Sciatic tension B   Neurological Screen:        Normal   Functional Mobility:         Gait/Ambulation: slightly asymmetrical with left hip in ER        Transfers:  Sit to stand without UE        Bed Mobility:  Normal         Stairs:  Not assessed  ·   Patient is expected to demonstrate progress in strength and range of motion to increase independence with ADLs and recreational activities . Reason for Services/Other Comments:  · Patient continues to require modification of therapeutic interventions to increase complexity of exercises. TREATMENT:   (In addition to Assessment/Re-Assessment sessions the following treatments were rendered)  Pre-treatment Symptoms/Complaints: pt states that she is doing fine today and pain no longer constant of shoulder however, had to ice after performing her HEP with thera band at home 2 days ago due to soreness. Pt states that she also felt left sided neck pain with t-band left shoulder ER. Pain: Initial:     0/10 left hip, 0/10 shoulder  Post Session:  0/10       Therapeutic Exercise: ( 25 min ):  Exercises per grid below to improve mobility and strength. Required moderate visual, verbal and manual cues to promote proper body alignment, promote proper body posture and promote proper body mechanics. Progressed resistance, range and repetitions as indicated.      Date:  3/30/18 Date:  4/2/18 Date:  4/5/18 4/9/18   Activity/Exercise Parameters Parameters Parameters    Education  POC  Discussed left shoulder mechanics with overhead reaching     Gerardo stretch   3 x 20 sec (supine) At end of mat x 2  Modified to 1/2 kneel hip flexor stretch (pt preference)    gastroc stretch   3 x 20 sec   HEP   nustep   X 10 min level 2  X 10 min level 3  X 10 min level 3    t-band ER/IR  NV RTB  x 10  X 10 YTB   t-band extension   NV RTB x 10  RTB x 10    t-band rows   NV RTB x 10  RTB x 10    Table walking (UE wbing)   5 x 3 steps     UBE    2/4 level 1       Manual x 25 min   · Inf and AP mobs grade IV-- left gh joint   · Long distraction with oscillation   · Grade IV- quadrant   · Deep MFR to left lateral deltoid, and biceps long head  · Trigger point release to left upper trap     Modalities:  Ice x 10 min left shoulder     Treatment/Session Assessment:    · Response to Treatment: focused primarily on left shoulder today due to current c/o's. Pt required some cuing for scap retraction and cervical positioning during HEP. No pain during or at end of session today. No new HEP given today. · Compliance with Program/Exercises: Will assess as treatment progresses.   · Recommendations/Intent for next treatment session: manual hip flexor release, gastroc stretching, scap strengthen, manual mobs to decrease impingement, progress HEP as able ,       Future Appointments  Date Time Provider Liana Bates   4/13/2018 8:45 AM Tomasz Sluder, PT [facility]   4/16/2018 8:45 AM Tomasz Sluder, PT [facility]   4/19/2018 8:45 AM Tomasz Sluder, PT [facility]   4/23/2018 8:00 AM Toamsz Sluder, PT [facility]   4/27/2018 8:00 AM Tomasz Sluder, PT [facility]   5/25/2018 8:00 AM Paty Gale MD [facility]       Total Treatment Duration: 60 min     PT Patient Time In/Time Out  Time In: 0935  Time Out: [address], PT

## 2018-04-13 ENCOUNTER — HOSPITAL ENCOUNTER (OUTPATIENT)
Dept: PHYSICAL THERAPY | Age: 53
Discharge: HOME | End: 2018-04-13
Attending: FAMILY MEDICINE
Payer: COMMERCIAL

## 2018-04-13 PROCEDURE — 97110 THERAPEUTIC EXERCISES: CPT

## 2018-04-13 PROCEDURE — 97140 MANUAL THERAPY 1/> REGIONS: CPT

## 2018-04-13 NOTE — PROGRESS NOTES
Malika Chi  : 1965  Payor: Lanier Rubinstein / [place] BLUE CR[place] OF [address] / Product Type: PPO /    [address], [address], [place], [id number] [place] [address] Road  [phone]   [phone]         OUTPATIENT PHYSICAL THERAPY:Daily Note 2018      ICD-10: Treatment Diagnosis: Pain in left hip (M25.552); difficulty walking (R26.2)  Left shoulder pain (M25.512)    Precautions/Allergies:   Codeine and Percocet [oxycodone-acetaminophen]   Fall Risk Score: 1 (? 5 = High Risk)  MD Orders: evaluate and treat MEDICAL/REFERRING DIAGNOSIS:  Sciatic neuropathy, left  Neck pain   DATE OF ONSET: 2018  REFERRING PHYSICIAN: Lizbeth Santacruz MD  RETURN PHYSICIAN APPOINTMENT: 18     INITIAL ASSESSMENT:  Ms. Gómez Peters presents with functional limitations due to left shoulder and hip pain. PT evaluation completed today revealing signs and symptoms consistent with left shoulder impingement, muscle imbalances of upper and lower extremities causing hip and shoulder restriction, as well as leg length discrepancy. Pt would highly benefit from skilled PT to reduce pain and improve overall functional mobility     PROBLEM LIST (Impacting functional limitations):  1. Decreased Strength  2. Decreased ADL/Functional Activities  3. Decreased Ambulation Ability/Technique  4. Increased Pain  5. Decreased Flexibility/Joint Mobility  6. Decreased Knowledge of Precautions  7. Decreased [place] with Home Exercise Program INTERVENTIONS PLANNED:  1. Cold  2. Gait Training  3. Home Exercise Program (HEP)  4. Manual Therapy  5. Neuromuscular Re-education/Strengthening  6. Range of Motion (ROM)  7. Therapeutic Exercise/Strengthening     TREATMENT PLAN:  Effective Dates: 3/30/2018 TO 2018 (90 days).  Frequency/Duration: 2 times a week for 60 Days    GOALS: (Goals have been discussed and agreed upon with patient.)  SHORT-TERM FUNCTIONAL GOALS: Time Frame: 2-4 weeks   1. Pt will be independent with HEP. 2. Pt will report sleep is no longer disrupted by pain of hip or shoulder on regular basis. 3. Pt will report pain of left shoulder is no longer constant. 4. Pt will complete functional outcome measure. DISCHARGE GOALS: Time Frame: 6-8 weeks   1. Pt will improve LEFS/DASH score by at least 10 points. 2. Pt will report ability to walk 30 + minutes without limitation of left hip pain. Rehabilitation Potential For Stated Goals: Good              The information in this section was collected on 3/30/18 (except where otherwise noted). HISTORY:   History of Present Injury/Illness (Reason for Referral):  Pt states that she was reaching out of car window 3 months ago to put a package in mailbox and felt intense sharp pain left left shoulder into neck. Swelling followed but resolved quickly. She denies numbness, tingling or bruising. She states since incident, her pain has localized to lateral shoulder but is constant and disrupting sleep. No imaging at this time. Pt also reports chronic off and on right hip pain, previously treated by PT in past with good results. Recently, pain has been returning and currently also reports left ankle feels a numb sensation with hip pain increase, but no pain of thigh or knee. She states a niraj injury of left ankle several years ago, leaving decreased mobility. Present symptoms (on day of initial evaluation): constant left lateral shoulder pain localized.  popping and decreased ROM in IR    Lateral left hip pain leading to Numbness of left ankle  · Aggravating factors: (shoulder)sleeping position, rolling on left side                                          (hip) sitting prolonged, sit stand, prolonged walking (has ankle injury-old with restriction)        · Relieving factors: prednisone, avoiding use of arm and hip     · Pain level: 3/10 present, 6/10 worst, 3/10 best      Past Medical History/Comorbidities:   Ms. Magdalene Bowling  has a past medical history of Depression; HTN (hypertension)   Low Risk (1/8/2016); Low serum vitamin D; Migraines; and Mixed hyperlipidemia (1/8/2016). Ms. Magdalene Bowling  has a past surgical history that includes hx orthopaedic; hx gyn; and hx colonoscopy. Social History/Living Environment:     lives with  2 story home   Prior Level of Function/Work/Activity:  Retired, walking in neighborhood, therapeutic riding, gardening   Dominant Side:         RIGHT  Other Clinical Tests:          none  Previous Treatment Approaches:          PT for hip pain   Current Medications:       Current Outpatient Prescriptions:     predniSONE (STERAPRED) 5 mg dose pack, See administration instruction per 5mg dose pack, Disp: 21 Tab, Rfl: 0    pramoxine-calamine (CALADRYL) 1-8 % lotion, Apply  to affected area three (3) times daily. , Disp: 180 mL, Rfl: 0    augmented betamethasone dipropionate (DIPROLENE-AF) 0.05 % ointment, Apply  to affected area two (2) times a day., Disp: 15 g, Rfl: 0    tavaborole (KERYDIN) 5 % moise, 1 Drop by Apply Externally route daily for 180 days. , Disp: 1 Bottle, Rfl: 2    montelukast (SINGULAIR) 10 mg tablet, Take 1 Tab by mouth daily for 180 days. , Disp: 90 Tab, Rfl: 1    ezetimibe-simvastatin (VYTORIN) 10-40 mg per tablet, Take 1 Tab by mouth nightly., Disp: 90 Tab, Rfl: 3    rizatriptan (MAXALT) 10 mg tablet, Take 1 Tab by mouth once as needed for Migraine. May repeat in 2 hours if needed  Indications: MIGRAINE, Disp: 6 Tab, Rfl: 1    estradiol (ESTRACE) 1 mg tablet, Take 1 Tab by mouth daily. , Disp: 90 Tab, Rfl: 3    EPINEPHrine (EPIPEN) 0.3 mg/0.3 mL injection, 0.3 mL by IntraMUSCular route once as needed for up to 1 dose., Disp: 3 Syringe, Rfl: 1    calcium citrate-vitamin D3 (CITRACAL WITH VITAMIN D MAXIMUM) tablet, Take  by mouth two (2) times a day., Disp: , Rfl:     ibuprofen (MOTRIN) 800 mg tablet, Take  by mouth., Disp: , Rfl:     omega-3 fatty acids-vitamin e (FISH OIL) 1,000 mg cap, Take 1 Cap by mouth., Disp: , Rfl:     coenzyme q10 (CO Q-10) 10 mg cap, Take  by mouth., Disp: , Rfl:     aspirin 81 mg chewable tablet, Take 81 mg by mouth daily. , Disp: , Rfl:    Date Last Reviewed:  4/13/2018     EXAMINATION:   Observation/Orthostatic Postural Assessment:    · Decreased lordotic curvature        · Elevated left iliac crest   · Left LE shorter than right (supine)    Palpation:          Point tenderness of lateral and anterior deltoid left shoulder   ROM:            Lumbar spine Date:  3/30/18 Date:     Direction  Parameters Parameters   Flexion  100%    Extension  100%    Rotation  R: 75%  L: 75%    Side bending  R: 75%  L: 75%    Hip IR R: WNL  L: restricted     Hip ER R: WNL  L: WNL           Shoulder ROM  DATE  3/10/18 DATE     flexion R: WNL  L: mild limitation cs R:   L:    abd R: WNL  L: WNL cs R:   L:    External rotation  R:  WNL  L: WNL R:   L:    Internal rotation R: WNL  L: restricted cs  R:   L:        Strength:           Shoulder ROM  DATE  3/30/18 DATE     flexion R: 5/5   L: 4+  R:   L:    abd R: 5  L: 4+ R:   L:    External rotation  R: 5  L: 5 R:   L:    Internal rotation R: 5  L: 5 R:   L:    Elbow flex/ext R: 5  L: 5 R:   L:                Lower quadrant    DATE  3/30/18 DATE     Hip flexion R: 5/5  L: 4+/5 R:   L:    Hip Abduction  R: 5/5  L: 5/5 R:   L:    Hip Extension  R: 5/5  L: 5/5  R:   L:    Knee Flexion  R: 5/5  L: 5/5 R:   L:    Knee Extension  R: 5/5  L: 5/5 R:   L:    Ankle Dorsiflexion  R: 5/5  L:4+/5 R:   L:   Ankle Plantarflexion  R: 5/5  L: 4+/5 R:  L:          Special Tests:          (-) Sciatic tension B   Neurological Screen:        Normal   Functional Mobility:         Gait/Ambulation: slightly asymmetrical with left hip in ER        Transfers:  Sit to stand without UE        Bed Mobility:  Normal         Stairs:  Not assessed  ·   Patient is expected to demonstrate progress in strength and range of motion to increase independence with ADLs and recreational activities . Reason for Services/Other Comments:  · Patient continues to require modification of therapeutic interventions to increase complexity of exercises. TREATMENT:   (In addition to Assessment/Re-Assessment sessions the following treatments were rendered)  Pre-treatment Symptoms/Complaints: pt states that exercises went much better since last session after we refined the shoulder HEP    Pain: Initial:     3/10 localized aching of left shoulder  Post Session:  0/10       Therapeutic Exercise: ( 20 min ):  Exercises per grid below to improve mobility and strength. Required moderate visual, verbal and manual cues to promote proper body alignment, promote proper body posture and promote proper body mechanics. Progressed resistance, range and repetitions as indicated.      Date:  3/30/18 Date:  4/2/18 Date:  4/5/18 4/9/18 4/13/18   Activity/Exercise Parameters Parameters Parameters     Education  POC  Discussed left shoulder mechanics with overhead reaching      Gerardo stretch   3 x 20 sec (supine) At end of mat x 2  Modified to 1/2 kneel hip flexor stretch (pt preference)     gastroc stretch   3 x 20 sec   HEP    nustep   X 10 min level 2  X 10 min level 3  X 10 min level 3     t-band ER/IR  NV RTB  x 10  X 10 YTB    t-band extension   NV RTB x 10  RTB x 10     t-band rows   NV RTB x 10  RTB x 10     Table walking (UE wbing)   5 x 3 steps       UBE    2/4 level 1  3/3 level 3    Lateral raise      2# x 10    Shoulder flexion      2# x 10    Shoulder scaption      2 # x 10    Diaphragmatic breathing      X 5 min       Manual x 25 min   · Inf and AP mobs grade IV-- left gh joint   · Long distraction with oscillation   · Grade IV quadrant   · Deep MFR to left lateral deltoid, and biceps long head  · Trigger point release to left upper trap and scalenes     Modalities:  Ice x 10 min left shoulder to decrease inflammation      Treatment/Session Assessment: · Response to Treatment: able to progress pt with strength training today. significant trigger point noted of left scalenes, 50% resolved with manual. No pain at end of session      · Compliance with Program/Exercises: Will assess as treatment progresses.   · Recommendations/Intent for next treatment session: manual hip flexor release, gastroc stretching, scap strengthen, manual mobs to decrease impingement, progress HEP as able ,       Future Appointments  Date Time Provider Liana Bates   4/16/2018 8:45 AM Erihc Bay, PT [facility]   4/19/2018 8:45 AM Erich Bay, PT [facility]   4/23/2018 8:00 AM Erich Bay, PT [facility]   4/27/2018 8:00 AM Erich Bay, PT [facility]   5/25/2018 8:00 AM Alycia Chavez MD [facility]       Total Treatment Duration: 60 min     PT Patient Time In/Time Out  Time In: 0845  Time Out: Jeremie Houser, PT

## 2018-04-16 ENCOUNTER — HOSPITAL ENCOUNTER (OUTPATIENT)
Dept: PHYSICAL THERAPY | Age: 53
Discharge: HOME | End: 2018-04-16
Attending: FAMILY MEDICINE
Payer: COMMERCIAL

## 2018-04-16 PROCEDURE — 97140 MANUAL THERAPY 1/> REGIONS: CPT

## 2018-04-16 PROCEDURE — 97110 THERAPEUTIC EXERCISES: CPT

## 2018-04-16 NOTE — PROGRESS NOTES
Kandis Dailey  : 1965  Payor: Rosa Galvez / [place] OF [address] / Product Type: PPO /    [address], [address], [place], [address] [place] [address] Road  [phone]   [phone]         OUTPATIENT PHYSICAL THERAPY:Daily Note 2018      ICD-10: Treatment Diagnosis: Pain in left hip (M25.552); difficulty walking (R26.2)  Left shoulder pain (M25.512)    Precautions/Allergies:   Codeine and Percocet [oxycodone-acetaminophen]   Fall Risk Score: 1 (? 5 = High Risk)  MD Orders: evaluate and treat MEDICAL/REFERRING DIAGNOSIS:  Sciatic neuropathy, left  Neck pain   DATE OF ONSET: 2018  REFERRING PHYSICIAN: Dari Comer MD  RETURN PHYSICIAN APPOINTMENT: 18     INITIAL ASSESSMENT:  Ms. Evelyn Mckeon presents with functional limitations due to left shoulder and hip pain. PT evaluation completed today revealing signs and symptoms consistent with left shoulder impingement, muscle imbalances of upper and lower extremities causing hip and shoulder restriction, as well as leg length discrepancy. Pt would highly benefit from skilled PT to reduce pain and improve overall functional mobility     PROBLEM LIST (Impacting functional limitations):  1. Decreased Strength  2. Decreased ADL/Functional Activities  3. Decreased Ambulation Ability/Technique  4. Increased Pain  5. Decreased Flexibility/Joint Mobility  6. Decreased Knowledge of Precautions  7. Decreased [place] with Home Exercise Program INTERVENTIONS PLANNED:  1. Cold  2. Gait Training  3. Home Exercise Program (HEP)  4. Manual Therapy  5. Neuromuscular Re-education/Strengthening  6. Range of Motion (ROM)  7. Therapeutic Exercise/Strengthening     TREATMENT PLAN:  Effective Dates: 3/30/2018 TO 2018 (90 days).  Frequency/Duration: 2 times a week for 60 Days    GOALS: (Goals have been discussed and agreed upon with patient.)  SHORT-TERM FUNCTIONAL GOALS: Time Frame: 2-4 weeks   1. Pt will be independent with HEP. 2. Pt will report sleep is no longer disrupted by pain of hip or shoulder on regular basis. 3. Pt will report pain of left shoulder is no longer constant. 4. Pt will complete functional outcome measure. DISCHARGE GOALS: Time Frame: 6-8 weeks   1. Pt will improve LEFS/DASH score by at least 10 points. 2. Pt will report ability to walk 30 + minutes without limitation of left hip pain. Rehabilitation Potential For Stated Goals: Good              The information in this section was collected on 3/30/18 (except where otherwise noted). HISTORY:   History of Present Injury/Illness (Reason for Referral):  Pt states that she was reaching out of car window 3 months ago to put a package in mailbox and felt intense sharp pain left left shoulder into neck. Swelling followed but resolved quickly. She denies numbness, tingling or bruising. She states since incident, her pain has localized to lateral shoulder but is constant and disrupting sleep. No imaging at this time. Pt also reports chronic off and on right hip pain, previously treated by PT in past with good results. Recently, pain has been returning and currently also reports left ankle feels a numb sensation with hip pain increase, but no pain of thigh or knee. She states a niraj injury of left ankle several years ago, leaving decreased mobility. Present symptoms (on day of initial evaluation): constant left lateral shoulder pain localized.  popping and decreased ROM in IR    Lateral left hip pain leading to Numbness of left ankle  · Aggravating factors: (shoulder)sleeping position, rolling on left side                                          (hip) sitting prolonged, sit stand, prolonged walking (has ankle injury-old with restriction)        · Relieving factors: prednisone, avoiding use of arm and hip     · Pain level: 3/10 present, 6/10 worst, 3/10 best      Past Medical History/Comorbidities:   Ms. Magdalene Bowling  has a past medical history of Depression; HTN (hypertension)   Low Risk (1/8/2016); Low serum vitamin D; Migraines; and Mixed hyperlipidemia (1/8/2016). Ms. Magdalene Bowling  has a past surgical history that includes hx orthopaedic; hx gyn; and hx colonoscopy. Social History/Living Environment:     lives with  2 story home   Prior Level of Function/Work/Activity:  Retired, walking in neighborhood, therapeutic riding, gardening   Dominant Side:         RIGHT  Other Clinical Tests:          none  Previous Treatment Approaches:          PT for hip pain   Current Medications:       Current Outpatient Prescriptions:     predniSONE (STERAPRED) 5 mg dose pack, See administration instruction per 5mg dose pack, Disp: 21 Tab, Rfl: 0    pramoxine-calamine (CALADRYL) 1-8 % lotion, Apply  to affected area three (3) times daily. , Disp: 180 mL, Rfl: 0    augmented betamethasone dipropionate (DIPROLENE-AF) 0.05 % ointment, Apply  to affected area two (2) times a day., Disp: 15 g, Rfl: 0    tavaborole (KERYDIN) 5 % moise, 1 Drop by Apply Externally route daily for 180 days. , Disp: 1 Bottle, Rfl: 2    montelukast (SINGULAIR) 10 mg tablet, Take 1 Tab by mouth daily for 180 days. , Disp: 90 Tab, Rfl: 1    ezetimibe-simvastatin (VYTORIN) 10-40 mg per tablet, Take 1 Tab by mouth nightly., Disp: 90 Tab, Rfl: 3    rizatriptan (MAXALT) 10 mg tablet, Take 1 Tab by mouth once as needed for Migraine. May repeat in 2 hours if needed  Indications: MIGRAINE, Disp: 6 Tab, Rfl: 1    estradiol (ESTRACE) 1 mg tablet, Take 1 Tab by mouth daily. , Disp: 90 Tab, Rfl: 3    EPINEPHrine (EPIPEN) 0.3 mg/0.3 mL injection, 0.3 mL by IntraMUSCular route once as needed for up to 1 dose., Disp: 3 Syringe, Rfl: 1    calcium citrate-vitamin D3 (CITRACAL WITH VITAMIN D MAXIMUM) tablet, Take  by mouth two (2) times a day., Disp: , Rfl:     ibuprofen (MOTRIN) 800 mg tablet, Take  by mouth., Disp: , Rfl:     omega-3 fatty acids-vitamin e (FISH OIL) 1,000 mg cap, Take 1 Cap by mouth., Disp: , Rfl:     coenzyme q10 (CO Q-10) 10 mg cap, Take  by mouth., Disp: , Rfl:     aspirin 81 mg chewable tablet, Take 81 mg by mouth daily. , Disp: , Rfl:    Date Last Reviewed:  4/16/2018     EXAMINATION:   Observation/Orthostatic Postural Assessment:    · Decreased lordotic curvature        · Elevated left iliac crest   · Left LE shorter than right (supine)    Palpation:          Point tenderness of lateral and anterior deltoid left shoulder   ROM:            Lumbar spine Date:  3/30/18 Date:     Direction  Parameters Parameters   Flexion  100%    Extension  100%    Rotation  R: 75%  L: 75%    Side bending  R: 75%  L: 75%    Hip IR R: WNL  L: restricted     Hip ER R: WNL  L: WNL           Shoulder ROM  DATE  3/10/18 DATE     flexion R: WNL  L: mild limitation cs R:   L:    abd R: WNL  L: WNL cs R:   L:    External rotation  R:  WNL  L: WNL R:   L:    Internal rotation R: WNL  L: restricted cs  R:   L:        Strength:           Shoulder ROM  DATE  3/30/18 DATE     flexion R: 5/5   L: 4+  R:   L:    abd R: 5  L: 4+ R:   L:    External rotation  R: 5  L: 5 R:   L:    Internal rotation R: 5  L: 5 R:   L:    Elbow flex/ext R: 5  L: 5 R:   L:                Lower quadrant    DATE  3/30/18 DATE     Hip flexion R: 5/5  L: 4+/5 R:   L:    Hip Abduction  R: 5/5  L: 5/5 R:   L:    Hip Extension  R: 5/5  L: 5/5  R:   L:    Knee Flexion  R: 5/5  L: 5/5 R:   L:    Knee Extension  R: 5/5  L: 5/5 R:   L:    Ankle Dorsiflexion  R: 5/5  L:4+/5 R:   L:   Ankle Plantarflexion  R: 5/5  L: 4+/5 R:  L:          Special Tests:          (-) Sciatic tension B   Neurological Screen:        Normal   Functional Mobility:         Gait/Ambulation: slightly asymmetrical with left hip in ER        Transfers:  Sit to stand without UE        Bed Mobility:  Normal         Stairs:  Not assessed  ·   Patient is expected to demonstrate progress in strength and range of motion to increase independence with ADLs and recreational activities . Reason for Services/Other Comments:  · Patient continues to require modification of therapeutic interventions to increase complexity of exercises. TREATMENT:   (In addition to Assessment/Re-Assessment sessions the following treatments were rendered)  Pre-treatment Symptoms/Complaints: pt reports her left hip pain is increased over the last few days but states that she is doing a lot of yard work and may be due to this. Pain: Initial:     3/10 localized aching of left shoulder at rest today. Post Session:  0/10       Therapeutic Exercise: ( 30 min ):  Exercises per grid below to improve mobility and strength. Required moderate visual, verbal and manual cues to promote proper body alignment, promote proper body posture and promote proper body mechanics. Progressed resistance, range and repetitions as indicated.      Date:  3/30/18 Date:  4/2/18 Date:  4/5/18 4/9/18 4/13/18 4/16/18   Activity/Exercise Parameters Parameters Parameters      Education  POC  Discussed left shoulder mechanics with overhead reaching       Gerardo stretch   3 x 20 sec (supine) At end of mat x 2  Modified to 1/2 kneel hip flexor stretch (pt preference)      gastroc stretch   3 x 20 sec   HEP     nustep   X 10 min level 2  X 10 min level 3  X 10 min level 3      t-band ER/IR  NV RTB  x 10  X 10 YTB     t-band extension   NV RTB x 10  RTB x 10      t-band rows   NV RTB x 10  RTB x 10      Table walking (UE wbing)   5 x 3 steps        UBE    2/4 level 1  3/3 level 3  3/4 level 3    Lateral raise      2# x 10  2# x 10    Shoulder flexion      2# x 10  2# x 10    Shoulder scaption      2 # x 10  2# x 10    Diaphragmatic breathing      X 5 min     Press ups       2# 2 x 5      Manual x 25 min   · Left hip flexor release  · Trigger point release to left lateral deltoid   · Grade IV- AP of left GH    ·  gentle traction with oscillation gh joint     Modalities:  None today Treatment/Session Assessment:    · Response to Treatment: tightness noted of left hip flexor, resolved with manual. No pain of left hip after treatment today. continue to progress HEP for strengthening shoulder girdle      · Compliance with Program/Exercises: Will assess as treatment progresses.   · Recommendations/Intent for next treatment session: manual hip flexor release, gastroc stretching, scap strengthen, manual mobs to decrease impingement, progress HEP as able ,       Future Appointments  Date Time Provider Liana Bates   4/19/2018 8:45 AM Olegario File, PT [facility]   4/23/2018 8:00 AM Olegario File, PT [facility]   4/27/2018 8:00 AM Melvin File, PT [facility]   5/25/2018 8:00 AM Nils Andersen MD [facility]       Total Treatment Duration: 60 min     PT Patient Time In/Time Out  Time In: 6090  Time Out: 1500 Jonathan Hardin, PT

## 2018-04-19 ENCOUNTER — HOSPITAL ENCOUNTER (OUTPATIENT)
Dept: PHYSICAL THERAPY | Age: 53
Discharge: HOME | End: 2018-04-19
Attending: FAMILY MEDICINE
Payer: COMMERCIAL

## 2018-04-19 PROCEDURE — 97110 THERAPEUTIC EXERCISES: CPT

## 2018-04-19 PROCEDURE — 97140 MANUAL THERAPY 1/> REGIONS: CPT

## 2018-04-19 NOTE — PROGRESS NOTES
Favian Diamond  : 1965  Payor: Helene Calero / [place] OF [address] [place] [address] Rd / Product Type: PPO /    [address] [place] [address] at [address] [place] [address]. [facility], [address], [facility], [address] [place] [address]  [phone]   [phone]         OUTPATIENT PHYSICAL THERAPY:Daily Note 2018      ICD-10: Treatment Diagnosis: Pain in left hip (M25.552); difficulty walking (R26.2)  Left shoulder pain (M25.512)    Precautions/Allergies:   Codeine and Percocet [oxycodone-acetaminophen]   Fall Risk Score: 1 (? 5 = High Risk)  MD Orders: evaluate and treat MEDICAL/REFERRING DIAGNOSIS:  Sciatic neuropathy, left  Neck pain   DATE OF ONSET: 2018  REFERRING PHYSICIAN: Rex Silverio MD  RETURN PHYSICIAN APPOINTMENT: 18     INITIAL ASSESSMENT:  Ms. Damian Fernandes presents with functional limitations due to left shoulder and hip pain. PT evaluation completed today revealing signs and symptoms consistent with left shoulder impingement, muscle imbalances of upper and lower extremities causing hip and shoulder restriction, as well as leg length discrepancy. Pt would highly benefit from skilled PT to reduce pain and improve overall functional mobility     PROBLEM LIST (Impacting functional limitations):  1. Decreased Strength  2. Decreased ADL/Functional Activities  3. Decreased Ambulation Ability/Technique  4. Increased Pain  5. Decreased Flexibility/Joint Mobility  6. Decreased Knowledge of Precautions  7. Decreased [place] with Home Exercise Program INTERVENTIONS PLANNED:  1. Cold  2. Gait Training  3. Home Exercise Program (HEP)  4. Manual Therapy  5. Neuromuscular Re-education/Strengthening  6. Range of Motion (ROM)  7. Therapeutic Exercise/Strengthening     TREATMENT PLAN:  Effective Dates: 3/30/2018 TO 2018 (90 days).  Frequency/Duration: 2 times a week for 60 Days    GOALS: (Goals have been discussed and agreed upon with patient.)  SHORT-TERM FUNCTIONAL GOALS: Time Frame: 2-4 weeks   1. Pt will be independent with HEP. 2. Pt will report sleep is no longer disrupted by pain of hip or shoulder on regular basis. 3. Pt will report pain of left shoulder is no longer constant. 4. Pt will complete functional outcome measure. DISCHARGE GOALS: Time Frame: 6-8 weeks   1. Pt will improve LEFS/DASH score by at least 10 points. 2. Pt will report ability to walk 30 + minutes without limitation of left hip pain. Rehabilitation Potential For Stated Goals: Good              The information in this section was collected on 3/30/18 (except where otherwise noted). HISTORY:   History of Present Injury/Illness (Reason for Referral):  Pt states that she was reaching out of car window 3 months ago to put a package in mailbox and felt intense sharp pain left left shoulder into neck. Swelling followed but resolved quickly. She denies numbness, tingling or bruising. She states since incident, her pain has localized to lateral shoulder but is constant and disrupting sleep. No imaging at this time. Pt also reports chronic off and on right hip pain, previously treated by PT in past with good results. Recently, pain has been returning and currently also reports left ankle feels a numb sensation with hip pain increase, but no pain of thigh or knee. She states a niraj injury of left ankle several years ago, leaving decreased mobility. Present symptoms (on day of initial evaluation): constant left lateral shoulder pain localized.  popping and decreased ROM in IR    Lateral left hip pain leading to Numbness of left ankle  · Aggravating factors: (shoulder)sleeping position, rolling on left side                                          (hip) sitting prolonged, sit stand, prolonged walking (has ankle injury-old with restriction)        · Relieving factors: prednisone, avoiding use of arm and hip     · Pain level: 3/10 present, 6/10 worst, 3/10 best      Past Medical History/Comorbidities:   Ms. Pk Grider  has a past medical history of Depression; HTN (hypertension)   Low Risk (1/8/2016); Low serum vitamin D; Migraines; and Mixed hyperlipidemia (1/8/2016). Ms. Pk Grider  has a past surgical history that includes hx orthopaedic; hx gyn; and hx colonoscopy. Social History/Living Environment:     lives with  2 story home   Prior Level of Function/Work/Activity:  Retired, walking in neighborhood, therapeutic riding, gardening   Dominant Side:         RIGHT  Other Clinical Tests:          none  Previous Treatment Approaches:          PT for hip pain   Current Medications:       Current Outpatient Prescriptions:     predniSONE (STERAPRED) 5 mg dose pack, See administration instruction per 5mg dose pack, Disp: 21 Tab, Rfl: 0    pramoxine-calamine (CALADRYL) 1-8 % lotion, Apply  to affected area three (3) times daily. , Disp: 180 mL, Rfl: 0    augmented betamethasone dipropionate (DIPROLENE-AF) 0.05 % ointment, Apply  to affected area two (2) times a day., Disp: 15 g, Rfl: 0    tavaborole (KERYDIN) 5 % moise, 1 Drop by Apply Externally route daily for 180 days. , Disp: 1 Bottle, Rfl: 2    montelukast (SINGULAIR) 10 mg tablet, Take 1 Tab by mouth daily for 180 days. , Disp: 90 Tab, Rfl: 1    ezetimibe-simvastatin (VYTORIN) 10-40 mg per tablet, Take 1 Tab by mouth nightly., Disp: 90 Tab, Rfl: 3    rizatriptan (MAXALT) 10 mg tablet, Take 1 Tab by mouth once as needed for Migraine. May repeat in 2 hours if needed  Indications: MIGRAINE, Disp: 6 Tab, Rfl: 1    estradiol (ESTRACE) 1 mg tablet, Take 1 Tab by mouth daily. , Disp: 90 Tab, Rfl: 3    EPINEPHrine (EPIPEN) 0.3 mg/0.3 mL injection, 0.3 mL by IntraMUSCular route once as needed for up to 1 dose., Disp: 3 Syringe, Rfl: 1    calcium citrate-vitamin D3 (CITRACAL WITH VITAMIN D MAXIMUM) tablet, Take  by mouth two (2) times a day., Disp: , Rfl:     ibuprofen (MOTRIN) 800 mg tablet, Take  by mouth., Disp: , Rfl:     omega-3 fatty acids-vitamin e (FISH OIL) 1,000 mg cap, Take 1 Cap by mouth., Disp: , Rfl:     coenzyme q10 (CO Q-10) 10 mg cap, Take  by mouth., Disp: , Rfl:     aspirin 81 mg chewable tablet, Take 81 mg by mouth daily. , Disp: , Rfl:    Date Last Reviewed:  4/19/2018     EXAMINATION:   Observation/Orthostatic Postural Assessment:    · Decreased lordotic curvature        · Elevated left iliac crest   · Left LE shorter than right (supine)    Palpation:          Point tenderness of lateral and anterior deltoid left shoulder   ROM:            Lumbar spine Date:  3/30/18 Date:     Direction  Parameters Parameters   Flexion  100%    Extension  100%    Rotation  R: 75%  L: 75%    Side bending  R: 75%  L: 75%    Hip IR R: WNL  L: restricted     Hip ER R: WNL  L: WNL           Shoulder ROM  DATE  3/10/18 DATE     flexion R: WNL  L: mild limitation cs R:   L:    abd R: WNL  L: WNL cs R:   L:    External rotation  R:  WNL  L: WNL R:   L:    Internal rotation R: WNL  L: restricted cs  R:   L:        Strength:           Shoulder ROM  DATE  3/30/18 DATE     flexion R: 5/5   L: 4+  R:   L:    abd R: 5  L: 4+ R:   L:    External rotation  R: 5  L: 5 R:   L:    Internal rotation R: 5  L: 5 R:   L:    Elbow flex/ext R: 5  L: 5 R:   L:                Lower quadrant    DATE  3/30/18 DATE     Hip flexion R: 5/5  L: 4+/5 R:   L:    Hip Abduction  R: 5/5  L: 5/5 R:   L:    Hip Extension  R: 5/5  L: 5/5  R:   L:    Knee Flexion  R: 5/5  L: 5/5 R:   L:    Knee Extension  R: 5/5  L: 5/5 R:   L:    Ankle Dorsiflexion  R: 5/5  L:4+/5 R:   L:   Ankle Plantarflexion  R: 5/5  L: 4+/5 R:  L:          Special Tests:          (-) Sciatic tension B   Neurological Screen:        Normal   Functional Mobility:         Gait/Ambulation: slightly asymmetrical with left hip in ER        Transfers:  Sit to stand without UE        Bed Mobility:  Normal         Stairs:  Not assessed  ·   Patient is expected to demonstrate progress in strength and range of motion to increase independence with ADLs and recreational activities . Reason for Services/Other Comments:  · Patient continues to require modification of therapeutic interventions to increase complexity of exercises. TREATMENT:   (In addition to Assessment/Re-Assessment sessions the following treatments were rendered)  Pre-treatment Symptoms/Complaints: pt states the day after treatment she was pain free. Next day pain returned but mild. Pain: Initial:     2/10 at rest of hip flexor and left shoulder    Post Session:  4/10 localized to left distal pectoral region       Therapeutic Exercise: ( 30 min ):  Exercises per grid below to improve mobility and strength. Required moderate visual, verbal and manual cues to promote proper body alignment, promote proper body posture and promote proper body mechanics. Progressed resistance, range and repetitions as indicated.      Date:  3/30/18 Date:  4/2/18 Date:  4/5/18 4/9/18 4/13/18 4/16/18 4/19/18   Activity/Exercise Parameters Parameters Parameters       Education  POC  Discussed left shoulder mechanics with overhead reaching        Gerardo stretch   3 x 20 sec (supine) At end of mat x 2  Modified to 1/2 kneel hip flexor stretch (pt preference)       gastroc stretch   3 x 20 sec   HEP   3 x 20 sec    nustep   X 10 min level 2  X 10 min level 3  X 10 min level 3    X 10 min level 3.5   t-band ER/IR  NV RTB  x 10  X 10 YTB      t-band extension   NV RTB x 10  RTB x 10    Cables 7# x 10    t-band rows   NV RTB x 10  RTB x 10    Cables 10# x 10    Table walking (UE wbing)   5 x 3 steps         UBE    2/4 level 1  3/3 level 3  3/4 level 3  4/4 level 3    Lateral raise      2# x 10  2# x 10  2# x 10    Shoulder flexion      2# x 10  2# x 10  2# x 10    Shoulder scaption      2 # x 10  2# x 10  2# x 10    Diaphragmatic breathing      X 5 min      Press ups       2# 2 x 5 2# x 6      Manual x 15 min   · Left hip flexor release  · Trigger point release to left lateral deltoid and left distal pectoral   · Grade IV- AP of left GH    ·  gentle traction with oscillation gh joint     Modalities:  Ice x 10 min to left shoulder to reduce post manual inflammation       Treatment/Session Assessment:  Increased trigger point noted of left distal pectoral region today, good release felt but advised pt of potential soreness. · Response to Treatment: pt states that she is sore after manual release of left pectoral.  Pt performed all exercises well with good technique. · Compliance with Program/Exercises: Will assess as treatment progresses.   · Recommendations/Intent for next treatment session: manual hip flexor release, gastroc stretching, scap strengthen, manual mobs to decrease impingement, progress HEP as able ,       Future Appointments  Date Time Provider Liana Husainisti   4/23/2018 8:00 AM RUDDY Clemens [facility]   4/27/2018 8:00 AM RUDDY Clemens [facility]   5/25/2018 8:00 AM Faye Handley MD [facility]       Total Treatment Duration: 60 min     PT Patient Time In/Time Out  Time In: 0845  Time Out: Jeremie Houser, PT

## 2018-04-23 ENCOUNTER — HOSPITAL ENCOUNTER (OUTPATIENT)
Dept: PHYSICAL THERAPY | Age: 53
Discharge: HOME | End: 2018-04-23
Attending: FAMILY MEDICINE
Payer: COMMERCIAL

## 2018-04-23 PROCEDURE — 97110 THERAPEUTIC EXERCISES: CPT

## 2018-04-23 PROCEDURE — 97140 MANUAL THERAPY 1/> REGIONS: CPT

## 2018-04-23 NOTE — PROGRESS NOTES
Malika Chi  : 1965  Payor: Lanier Rubinstein / [place]UE [place] OF [address] / Product Type: PPO /    [address] at [address]. [facility], [address], [facility], [address] [place] [address] Road  [phone]   [phone]         OUTPATIENT PHYSICAL THERAPY:Daily Note 2018      ICD-10: Treatment Diagnosis: Pain in left hip (M25.552); difficulty walking (R26.2)  Left shoulder pain (M25.512)    Precautions/Allergies:   Codeine and Percocet [oxycodone-acetaminophen]   Fall Risk Score: 1 (? 5 = High Risk)  MD Orders: evaluate and treat MEDICAL/REFERRING DIAGNOSIS:  Sciatic neuropathy, left  Neck pain   DATE OF ONSET: 2018  REFERRING PHYSICIAN: Lizbeth Santacruz MD  RETURN PHYSICIAN APPOINTMENT: 18     INITIAL ASSESSMENT:  Ms. Gómez Peters presents with functional limitations due to left shoulder and hip pain. PT evaluation completed today revealing signs and symptoms consistent with left shoulder impingement, muscle imbalances of upper and lower extremities causing hip and shoulder restriction, as well as leg length discrepancy. Pt would highly benefit from skilled PT to reduce pain and improve overall functional mobility     PROBLEM LIST (Impacting functional limitations):  1. Decreased Strength  2. Decreased ADL/Functional Activities  3. Decreased Ambulation Ability/Technique  4. Increased Pain  5. Decreased Flexibility/Joint Mobility  6. Decreased Knowledge of Precautions  7. Decreased [place] with Home Exercise Program INTERVENTIONS PLANNED:  1. Cold  2. Gait Training  3. Home Exercise Program (HEP)  4. Manual Therapy  5. Neuromuscular Re-education/Strengthening  6. Range of Motion (ROM)  7. Therapeutic Exercise/Strengthening     TREATMENT PLAN:  Effective Dates: 3/30/2018 TO 2018 (90 days).  Frequency/Duration: 2 times a week for 60 Days    GOALS: (Goals have been discussed and agreed upon with patient.)  SHORT-TERM FUNCTIONAL GOALS: Time Frame: 2-4 weeks   1. Pt will be independent with HEP. 2. Pt will report sleep is no longer disrupted by pain of hip or shoulder on regular basis. 3. Pt will report pain of left shoulder is no longer constant. 4. Pt will complete functional outcome measure. DISCHARGE GOALS: Time Frame: 6-8 weeks   1. Pt will improve LEFS/DASH score by at least 10 points. 2. Pt will report ability to walk 30 + minutes without limitation of left hip pain. Rehabilitation Potential For Stated Goals: Good              The information in this section was collected on 3/30/18 (except where otherwise noted). HISTORY:   History of Present Injury/Illness (Reason for Referral):  Pt states that she was reaching out of car window 3 months ago to put a package in mailbox and felt intense sharp pain left left shoulder into neck. Swelling followed but resolved quickly. She denies numbness, tingling or bruising. She states since incident, her pain has localized to lateral shoulder but is constant and disrupting sleep. No imaging at this time. Pt also reports chronic off and on right hip pain, previously treated by PT in past with good results. Recently, pain has been returning and currently also reports left ankle feels a numb sensation with hip pain increase, but no pain of thigh or knee. She states a niraj injury of left ankle several years ago, leaving decreased mobility. Present symptoms (on day of initial evaluation): constant left lateral shoulder pain localized.  popping and decreased ROM in IR    Lateral left hip pain leading to Numbness of left ankle  · Aggravating factors: (shoulder)sleeping position, rolling on left side                                          (hip) sitting prolonged, sit stand, prolonged walking (has ankle injury-old with restriction)        · Relieving factors: prednisone, avoiding use of arm and hip     · Pain level: 3/10 present, 6/10 worst, 3/10 best      Past Medical History/Comorbidities:   Ms. Stephen Geronimo  has a past medical history of Depression; HTN (hypertension)   Low Risk (1/8/2016); Low serum vitamin D; Migraines; and Mixed hyperlipidemia (1/8/2016). Ms. Stephen Geronimo  has a past surgical history that includes hx orthopaedic; hx gyn; and hx colonoscopy. Social History/Living Environment:     lives with  2 story home   Prior Level of Function/Work/Activity:  Retired, walking in neighborhood, therapeutic riding, gardening   Dominant Side:         RIGHT  Other Clinical Tests:          none  Previous Treatment Approaches:          PT for hip pain   Current Medications:       Current Outpatient Prescriptions:     predniSONE (STERAPRED) 5 mg dose pack, See administration instruction per 5mg dose pack, Disp: 21 Tab, Rfl: 0    pramoxine-calamine (CALADRYL) 1-8 % lotion, Apply  to affected area three (3) times daily. , Disp: 180 mL, Rfl: 0    augmented betamethasone dipropionate (DIPROLENE-AF) 0.05 % ointment, Apply  to affected area two (2) times a day., Disp: 15 g, Rfl: 0    tavaborole (KERYDIN) 5 % moise, 1 Drop by Apply Externally route daily for 180 days. , Disp: 1 Bottle, Rfl: 2    montelukast (SINGULAIR) 10 mg tablet, Take 1 Tab by mouth daily for 180 days. , Disp: 90 Tab, Rfl: 1    ezetimibe-simvastatin (VYTORIN) 10-40 mg per tablet, Take 1 Tab by mouth nightly., Disp: 90 Tab, Rfl: 3    rizatriptan (MAXALT) 10 mg tablet, Take 1 Tab by mouth once as needed for Migraine. May repeat in 2 hours if needed  Indications: MIGRAINE, Disp: 6 Tab, Rfl: 1    estradiol (ESTRACE) 1 mg tablet, Take 1 Tab by mouth daily. , Disp: 90 Tab, Rfl: 3    EPINEPHrine (EPIPEN) 0.3 mg/0.3 mL injection, 0.3 mL by IntraMUSCular route once as needed for up to 1 dose., Disp: 3 Syringe, Rfl: 1    calcium citrate-vitamin D3 (CITRACAL WITH VITAMIN D MAXIMUM) tablet, Take  by mouth two (2) times a day., Disp: , Rfl:     ibuprofen (MOTRIN) 800 mg tablet, Take  by mouth., Disp: , Rfl:     omega-3 fatty acids-vitamin e (FISH OIL) 1,000 mg cap, Take 1 Cap by mouth., Disp: , Rfl:     coenzyme q10 (CO Q-10) 10 mg cap, Take  by mouth., Disp: , Rfl:     aspirin 81 mg chewable tablet, Take 81 mg by mouth daily. , Disp: , Rfl:    Date Last Reviewed:  4/23/2018     EXAMINATION:   Observation/Orthostatic Postural Assessment:    · Decreased lordotic curvature        · Elevated left iliac crest   · Left LE shorter than right (supine)    Palpation:          Point tenderness of lateral and anterior deltoid left shoulder   ROM:            Lumbar spine Date:  3/30/18 Date:     Direction  Parameters Parameters   Flexion  100%    Extension  100%    Rotation  R: 75%  L: 75%    Side bending  R: 75%  L: 75%    Hip IR R: WNL  L: restricted     Hip ER R: WNL  L: WNL           Shoulder ROM  DATE  3/10/18 DATE     flexion R: WNL  L: mild limitation cs R:   L:    abd R: WNL  L: WNL cs R:   L:    External rotation  R:  WNL  L: WNL R:   L:    Internal rotation R: WNL  L: restricted cs  R:   L:        Strength:           Shoulder ROM  DATE  3/30/18 DATE     flexion R: 5/5   L: 4+  R:   L:    abd R: 5  L: 4+ R:   L:    External rotation  R: 5  L: 5 R:   L:    Internal rotation R: 5  L: 5 R:   L:    Elbow flex/ext R: 5  L: 5 R:   L:                Lower quadrant    DATE  3/30/18 DATE     Hip flexion R: 5/5  L: 4+/5 R:   L:    Hip Abduction  R: 5/5  L: 5/5 R:   L:    Hip Extension  R: 5/5  L: 5/5  R:   L:    Knee Flexion  R: 5/5  L: 5/5 R:   L:    Knee Extension  R: 5/5  L: 5/5 R:   L:    Ankle Dorsiflexion  R: 5/5  L:4+/5 R:   L:   Ankle Plantarflexion  R: 5/5  L: 4+/5 R:  L:          Special Tests:          (-) Sciatic tension B   Neurological Screen:        Normal   Functional Mobility:         Gait/Ambulation: slightly asymmetrical with left hip in ER        Transfers:  Sit to stand without UE        Bed Mobility:  Normal         Stairs:  Not assessed  ·   Patient is expected to demonstrate progress in strength and range of motion to increase independence with ADLs and recreational activities . Reason for Services/Other Comments:  · Patient continues to require modification of therapeutic interventions to increase complexity of exercises. TREATMENT:   (In addition to Assessment/Re-Assessment sessions the following treatments were rendered)  Pre-treatment Symptoms/Complaints: pt states that overall, pain of shoulder and hip continue to decrease and she is very happy with her progress. Pain: Initial:     2/10 left shoulder, 2/10 left hip     Post Session:  2/10     Therapeutic Exercise: (  20 min ):  Exercises per grid below to improve mobility and strength. Required moderate visual, verbal and manual cues to promote proper body alignment, promote proper body posture and promote proper body mechanics. Progressed resistance, range and repetitions as indicated.      Date:  4/2/18 Date:  4/5/18 4/9/18 4/13/18 4/16/18 4/19/18 4/23/18   Activity/Exercise Parameters Parameters        Education   Discussed left shoulder mechanics with overhead reaching         Gerardo stretch  3 x 20 sec (supine) At end of mat x 2  Modified to 1/2 kneel hip flexor stretch (pt preference)        gastroc stretch  3 x 20 sec   HEP   3 x 20 sec     nustep  X 10 min level 2  X 10 min level 3  X 10 min level 3    X 10 min level 3.5    t-band ER/IR NV RTB  x 10  X 10 YTB       t-band extension  NV RTB x 10  RTB x 10    Cables 7# x 10  Cable 3# x 15   t-band rows  NV RTB x 10  RTB x 10    Cables 10# x 10  Cables 10#   Table walking (UE wbing)  5 x 3 steps          UBE   2/4 level 1  3/3 level 3  3/4 level 3  4/4 level 3  4/4 level 3    Lateral raise     2# x 10  2# x 10  2# x 10     Shoulder flexion     2# x 10  2# x 10  2# x 10     Shoulder scaption     2 # x 10  2# x 10  2# x 10     Diaphragmatic breathing     X 5 min       Press ups      2# 2 x 5 2# x 6       Manual x 25 min   · Left hip flexor release  · Trigger point release to left lateral deltoid and left distal pectoral   · Grade IV- AP of left GH    ·  gentle traction with oscillation gh joint     Modalities:  Ice x 10 min to left shoulder to reduce post manual inflammation       Treatment/Session Assessment: trigger points resolved with manual.  Exercises performed well with no c/o's. · Response to Treatment: pt states that she is sore after manual release of left pectoral.  Pt performed all exercises well with good technique. · Compliance with Program/Exercises: Will assess as treatment progresses.   · Recommendations/Intent for next treatment session: manual hip flexor release, gastroc stretching, scap strengthen, manual mobs to decrease impingement, progress HEP as able ,       Future Appointments  Date Time Provider Liana Etiennei   4/27/2018 8:00 AM [facility]ure[facility]   5/25/2018 8:00 AM Faye Handley MD [facility]       Total Treatment Duration: 55 min     PT Patient Time In/Time Out  Time In: 0800  Time Out: Meliton Houser, PT

## 2018-04-27 ENCOUNTER — HOSPITAL ENCOUNTER (OUTPATIENT)
Dept: PHYSICAL THERAPY | Age: 53
Discharge: HOME | End: 2018-04-27
Attending: FAMILY MEDICINE
Payer: COMMERCIAL

## 2018-04-27 PROCEDURE — 97016 VASOPNEUMATIC DEVICE THERAPY: CPT

## 2018-04-27 PROCEDURE — 97140 MANUAL THERAPY 1/> REGIONS: CPT

## 2018-04-27 PROCEDURE — 97110 THERAPEUTIC EXERCISES: CPT

## 2018-04-27 NOTE — PROGRESS NOTES
Brinda Juarez  : 1965  Payor: Jose Gutiérrez / Plan: [place] BLUE CR[place] OF [address] / Product Type: PPO /    [address] [facility], [address], [facility], [address] [place] [address] Road  [phone]   [phone]         OUTPATIENT PHYSICAL THERAPY:Daily Note 2018      ICD-10: Treatment Diagnosis: Pain in left hip (M25.552); difficulty walking (R26.2)  Left shoulder pain (M25.512)    Precautions/Allergies:   Codeine and Percocet [oxycodone-acetaminophen]   Fall Risk Score: 1 (? 5 = High Risk)  MD Orders: evaluate and treat MEDICAL/REFERRING DIAGNOSIS:  Sciatic neuropathy, left  Neck pain   DATE OF ONSET: 2018  REFERRING PHYSICIAN: Smita Burks MD  RETURN PHYSICIAN APPOINTMENT: 18     INITIAL ASSESSMENT:  Ms. Eugenia John presents with functional limitations due to left shoulder and hip pain. PT evaluation completed today revealing signs and symptoms consistent with left shoulder impingement, muscle imbalances of upper and lower extremities causing hip and shoulder restriction, as well as leg length discrepancy. Pt would highly benefit from skilled PT to reduce pain and improve overall functional mobility     PROBLEM LIST (Impacting functional limitations):  1. Decreased Strength  2. Decreased ADL/Functional Activities  3. Decreased Ambulation Ability/Technique  4. Increased Pain  5. Decreased Flexibility/Joint Mobility  6. Decreased Knowledge of Precautions  7. Decreased [place] with Home Exercise Program INTERVENTIONS PLANNED:  1. Cold  2. Gait Training  3. Home Exercise Program (HEP)  4. Manual Therapy  5. Neuromuscular Re-education/Strengthening  6. Range of Motion (ROM)  7. Therapeutic Exercise/Strengthening     TREATMENT PLAN:  Effective Dates: 3/30/2018 TO 2018 (90 days).  Frequency/Duration: 2 times a week for 60 Days    GOALS: (Goals have been discussed and agreed upon with patient.)  SHORT-TERM FUNCTIONAL GOALS: Time Frame: 2-4 weeks   1. Pt will be independent with HEP. (met)  2. Pt will report sleep is no longer disrupted by pain of hip or shoulder on regular basis. (ongoing)  3. Pt will report pain of left shoulder is no longer constant. (met)  4. Pt will complete functional outcome measure.(met-DASH)  DISCHARGE GOALS: Time Frame: 6-8 weeks   1. Pt will improve LEFS/DASH score by at least 10 points. 2. Pt will report ability to walk 30 + minutes without limitation of left hip pain. (  Rehabilitation Potential For Stated Goals: Good              The information in this section was collected on 3/30/18 (except where otherwise noted). HISTORY:   History of Present Injury/Illness (Reason for Referral):  Pt states that she was reaching out of car window 3 months ago to put a package in mailbox and felt intense sharp pain left left shoulder into neck. Swelling followed but resolved quickly. She denies numbness, tingling or bruising. She states since incident, her pain has localized to lateral shoulder but is constant and disrupting sleep. No imaging at this time. Pt also reports chronic off and on right hip pain, previously treated by PT in past with good results. Recently, pain has been returning and currently also reports left ankle feels a numb sensation with hip pain increase, but no pain of thigh or knee. She states a niraj injury of left ankle several years ago, leaving decreased mobility. Present symptoms (on day of initial evaluation): constant left lateral shoulder pain localized.  popping and decreased ROM in IR    Lateral left hip pain leading to Numbness of left ankle  · Aggravating factors: (shoulder)sleeping position, rolling on left side                                          (hip) sitting prolonged, sit stand, prolonged walking (has ankle injury-old with restriction)        · Relieving factors: prednisone, avoiding use of arm and hip     · Pain level: 3/10 present, 6/10 worst, 3/10 best Past Medical History/Comorbidities:   Ms. Deatra Goodell  has a past medical history of Depression; HTN (hypertension)   Low Risk (1/8/2016); Low serum vitamin D; Migraines; and Mixed hyperlipidemia (1/8/2016). Ms. Deatra Goodell  has a past surgical history that includes hx orthopaedic; hx gyn; and hx colonoscopy. Social History/Living Environment:     lives with  2 story home   Prior Level of Function/Work/Activity:  Retired, walking in neighborhood, therapeutic riding, gardening   Dominant Side:         RIGHT  Other Clinical Tests:          none  Previous Treatment Approaches:          PT for hip pain   Current Medications:       Current Outpatient Prescriptions:     predniSONE (STERAPRED) 5 mg dose pack, See administration instruction per 5mg dose pack, Disp: 21 Tab, Rfl: 0    pramoxine-calamine (CALADRYL) 1-8 % lotion, Apply  to affected area three (3) times daily. , Disp: 180 mL, Rfl: 0    augmented betamethasone dipropionate (DIPROLENE-AF) 0.05 % ointment, Apply  to affected area two (2) times a day., Disp: 15 g, Rfl: 0    tavaborole (KERYDIN) 5 % moise, 1 Drop by Apply Externally route daily for 180 days. , Disp: 1 Bottle, Rfl: 2    montelukast (SINGULAIR) 10 mg tablet, Take 1 Tab by mouth daily for 180 days. , Disp: 90 Tab, Rfl: 1    ezetimibe-simvastatin (VYTORIN) 10-40 mg per tablet, Take 1 Tab by mouth nightly., Disp: 90 Tab, Rfl: 3    rizatriptan (MAXALT) 10 mg tablet, Take 1 Tab by mouth once as needed for Migraine. May repeat in 2 hours if needed  Indications: MIGRAINE, Disp: 6 Tab, Rfl: 1    estradiol (ESTRACE) 1 mg tablet, Take 1 Tab by mouth daily. , Disp: 90 Tab, Rfl: 3    EPINEPHrine (EPIPEN) 0.3 mg/0.3 mL injection, 0.3 mL by IntraMUSCular route once as needed for up to 1 dose., Disp: 3 Syringe, Rfl: 1    calcium citrate-vitamin D3 (CITRACAL WITH VITAMIN D MAXIMUM) tablet, Take  by mouth two (2) times a day., Disp: , Rfl:     ibuprofen (MOTRIN) 800 mg tablet, Take  by mouth., Disp: , Rfl:   omega-3 fatty acids-vitamin e (FISH OIL) 1,000 mg cap, Take 1 Cap by mouth., Disp: , Rfl:     coenzyme q10 (CO Q-10) 10 mg cap, Take  by mouth., Disp: , Rfl:     aspirin 81 mg chewable tablet, Take 81 mg by mouth daily. , Disp: , Rfl:    Date Last Reviewed:  4/27/2018     EXAMINATION:   Observation/Orthostatic Postural Assessment:    · Decreased lordotic curvature        · Elevated left iliac crest   · Left LE shorter than right (supine)    Palpation:          Point tenderness of lateral and anterior deltoid left shoulder   ROM:            Lumbar spine Date:  3/30/18 Date:     Direction  Parameters Parameters   Flexion  100%    Extension  100%    Rotation  R: 75%  L: 75%    Side bending  R: 75%  L: 75%    Hip IR R: WNL  L: restricted     Hip ER R: WNL  L: WNL           Shoulder ROM  DATE  3/10/18 DATE     flexion R: WNL  L: mild limitation cs R:   L:    abd R: WNL  L: WNL cs R:   L:    External rotation  R:  WNL  L: WNL R:   L:    Internal rotation R: WNL  L: restricted cs  R:   L:        Strength:           Shoulder ROM  DATE  3/30/18 DATE     flexion R: 5/5   L: 4+  R:   L:    abd R: 5  L: 4+ R:   L:    External rotation  R: 5  L: 5 R:   L:    Internal rotation R: 5  L: 5 R:   L:    Elbow flex/ext R: 5  L: 5 R:   L:                Lower quadrant    DATE  3/30/18 DATE     Hip flexion R: 5/5  L: 4+/5 R:   L:    Hip Abduction  R: 5/5  L: 5/5 R:   L:    Hip Extension  R: 5/5  L: 5/5  R:   L:    Knee Flexion  R: 5/5  L: 5/5 R:   L:    Knee Extension  R: 5/5  L: 5/5 R:   L:    Ankle Dorsiflexion  R: 5/5  L:4+/5 R:   L:   Ankle Plantarflexion  R: 5/5  L: 4+/5 R:  L:          Tool Used: Disabilities of the Arm, Shoulder and Hand (DASH) Questionnaire - Quick Version  Score:  Initial: 17/55  Most Recent: X/55 (Date: -- )   Interpretation of Score: The DASH is designed to measure the activities of daily living in person's with upper extremity dysfunction or pain.   Each section is scored on a 1-5 scale, 5 representing the greatest disability. The scores of each section are added together for a total score of 55. Special Tests:          (-) Sciatic tension B   Neurological Screen:        Normal   Functional Mobility:         Gait/Ambulation: slightly asymmetrical with left hip in ER        Transfers:  Sit to stand without UE        Bed Mobility:  Normal         Stairs:  Not assessed  ·   Patient is expected to demonstrate progress in strength and range of motion to increase independence with ADLs and recreational activities . Reason for Services/Other Comments:  · Patient continues to require modification of therapeutic interventions to increase complexity of exercises. TREATMENT:   (In addition to Assessment/Re-Assessment sessions the following treatments were rendered)  Pre-treatment Symptoms/Complaints: pt states that the shoulder is much better but the left hip continues to be painful and limiting. Pt states that she had no pain of hip for one day but currently she feels constant hip pain. Would like to focus on this today. Pain: Initial:     2/10 hip, 0/10 left shoulder     Post Session: sore of low back, no other complaints   Objective: pt demonstrates left IC elevated, left PSIS lower than right, and increased tone of left QL and paraspinals    Therapeutic Exercise: (  15 min ):  Exercises per grid below to improve mobility and strength. Required moderate visual, verbal and manual cues to promote proper body alignment, promote proper body posture and promote proper body mechanics. Progressed resistance, range and repetitions as indicated.      Date:  4/2/18 Date:  4/5/18 4/9/18 4/13/18 4/16/18 4/19/18 4/23/18 4/27/18   Activity/Exercise Parameters Parameters         Education   Discussed left shoulder mechanics with overhead reaching          Gerardo stretch  3 x 20 sec (supine) At end of mat x 2  Modified to 1/2 kneel hip flexor stretch (pt preference)         gastroc stretch  3 x 20 sec   HEP 3 x 20 sec      nustep  X 10 min level 2  X 10 min level 3  X 10 min level 3    X 10 min level 3.5  X 10 min level 3.5    t-band ER/IR NV RTB  x 10  X 10 YTB        t-band extension  NV RTB x 10  RTB x 10    Cables 7# x 10  Cable 3# x 15    t-band rows  NV RTB x 10  RTB x 10    Cables 10# x 10  Cables 10#    Table walking (UE wbing)  5 x 3 steps           UBE   2/4 level 1  3/3 level 3  3/4 level 3  4/4 level 3  4/4 level 3     Lateral raise     2# x 10  2# x 10  2# x 10      Shoulder flexion     2# x 10  2# x 10  2# x 10      Shoulder scaption     2 # x 10  2# x 10  2# x 10      Diaphragmatic breathing     X 5 min        Press ups      2# 2 x 5 2# x 6     AROM lumbro sacral         Flex/extension                                        Manual x 30 min   · Left hip flexor release  · Deep MFR to left QL  · Grade IV PA's of left PSIS  · Anterior rotation mob to left innominate in prone  · Long distraction of left LE        Modalities:  Ice x 10 min to lumbrosacral region        Treatment/Session Assessment:    · Response to Treatment: .  trigger points resolved with manual. Left innom in alignment after mobilization. Soreness of left SIJ region aend of session, no c/o's of shoulder or hip end of session   · Compliance with Program/Exercises: Will assess as treatment progresses.   · Recommendations/Intent for next treatment session: manual hip flexor release, gastroc stretching, scap strengthen, manual mobs to decrease impingement, progress HEP as able ,       Future Appointments  Date Time Provider Liana Bates   5/3/2018 8:45 AM Yaritza Castellanos PT [facility]   5/7/2018 9:30 AM [facility]   5/11/2018 10:15 [facility]   5/25/2018 8:00 AM Gianna Burgess MD [facility]       Total Treatment Duration: 55 min     PT Patient Time In/Time Out  Time In: 0800  Time Out: [address], PT

## 2018-04-30 ENCOUNTER — APPOINTMENT (OUTPATIENT)
Dept: PHYSICAL THERAPY | Age: 53
End: 2018-04-30
Attending: FAMILY MEDICINE
Payer: COMMERCIAL

## 2018-05-02 ENCOUNTER — APPOINTMENT (OUTPATIENT)
Dept: PHYSICAL THERAPY | Age: 53
End: 2018-05-02
Attending: FAMILY MEDICINE
Payer: COMMERCIAL

## 2018-05-03 ENCOUNTER — HOSPITAL ENCOUNTER (OUTPATIENT)
Dept: PHYSICAL THERAPY | Age: 53
Discharge: HOME | End: 2018-05-03
Attending: FAMILY MEDICINE
Payer: COMMERCIAL

## 2018-05-03 PROCEDURE — 97140 MANUAL THERAPY 1/> REGIONS: CPT

## 2018-05-03 PROCEDURE — 97110 THERAPEUTIC EXERCISES: CPT

## 2018-05-03 NOTE — PROGRESS NOTES
Abimael Diana  : 1965  Payor: Sydney Velazquez / [place] BLUE [place] OF [address] / Product Type: PPO /    [address], [place], [address] [place] [address]  [phone]   [phone]         OUTPATIENT PHYSICAL THERAPY:Daily Note 5/3/2018      ICD-10: Treatment Diagnosis: Pain in left hip (M25.552); difficulty walking (R26.2)  Left shoulder pain (M25.512)    Precautions/Allergies:   Codeine and Percocet [oxycodone-acetaminophen]   Fall Risk Score: 1 (? 5 = High Risk)  MD Orders: evaluate and treat MEDICAL/REFERRING DIAGNOSIS:  Sciatic neuropathy, left  Neck pain   DATE OF ONSET: 2018  REFERRING PHYSICIAN: Héctor Ambrocio MD  RETURN PHYSICIAN APPOINTMENT: 18     INITIAL ASSESSMENT:  Ms. Pura Couhc presents with functional limitations due to left shoulder and hip pain. PT evaluation completed today revealing signs and symptoms consistent with left shoulder impingement, muscle imbalances of upper and lower extremities causing hip and shoulder restriction, as well as leg length discrepancy. Pt would highly benefit from skilled PT to reduce pain and improve overall functional mobility     PROBLEM LIST (Impacting functional limitations):  1. Decreased Strength  2. Decreased ADL/Functional Activities  3. Decreased Ambulation Ability/Technique  4. Increased Pain  5. Decreased Flexibility/Joint Mobility  6. Decreased Knowledge of Precautions  7. Decreased [place] with Home Exercise Program INTERVENTIONS PLANNED:  1. Cold  2. Gait Training  3. Home Exercise Program (HEP)  4. Manual Therapy  5. Neuromuscular Re-education/Strengthening  6. Range of Motion (ROM)  7. Therapeutic Exercise/Strengthening     TREATMENT PLAN:  Effective Dates: 3/30/2018 TO 2018 (90 days).  Frequency/Duration: 2 times a week for 60 Days    GOALS: (Goals have been discussed and agreed upon with patient.)  SHORT-TERM FUNCTIONAL GOALS: Time Frame: 2-4 weeks 1. Pt will be independent with HEP. (met)  2. Pt will report sleep is no longer disrupted by pain of hip or shoulder on regular basis. (ongoing)  3. Pt will report pain of left shoulder is no longer constant. (met)  4. Pt will complete functional outcome measure.(met-DASH)  DISCHARGE GOALS: Time Frame: 6-8 weeks   1. Pt will improve LEFS/DASH score by at least 10 points. 2. Pt will report ability to walk 30 + minutes without limitation of left hip pain. (  Rehabilitation Potential For Stated Goals: Good              The information in this section was collected on 3/30/18 (except where otherwise noted). HISTORY:   History of Present Injury/Illness (Reason for Referral):  Pt states that she was reaching out of car window 3 months ago to put a package in mailbox and felt intense sharp pain left left shoulder into neck. Swelling followed but resolved quickly. She denies numbness, tingling or bruising. She states since incident, her pain has localized to lateral shoulder but is constant and disrupting sleep. No imaging at this time. Pt also reports chronic off and on right hip pain, previously treated by PT in past with good results. Recently, pain has been returning and currently also reports left ankle feels a numb sensation with hip pain increase, but no pain of thigh or knee. She states a niraj injury of left ankle several years ago, leaving decreased mobility. Present symptoms (on day of initial evaluation): constant left lateral shoulder pain localized.  popping and decreased ROM in IR    Lateral left hip pain leading to Numbness of left ankle  · Aggravating factors: (shoulder)sleeping position, rolling on left side                                          (hip) sitting prolonged, sit stand, prolonged walking (has ankle injury-old with restriction)        · Relieving factors: prednisone, avoiding use of arm and hip     · Pain level: 3/10 present, 6/10 worst, 3/10 best      Past Medical History/Comorbidities:   Ms. Max Boone  has a past medical history of Depression; HTN (hypertension)   Low Risk (1/8/2016); Low serum vitamin D; Migraines; and Mixed hyperlipidemia (1/8/2016). Ms. Max Boone  has a past surgical history that includes hx orthopaedic; hx gyn; and hx colonoscopy. Social History/Living Environment:     lives with  2 story home   Prior Level of Function/Work/Activity:  Retired, walking in neighborhood, therapeutic riding, gardening   Dominant Side:         RIGHT  Other Clinical Tests:          none  Previous Treatment Approaches:          PT for hip pain   Current Medications:       Current Outpatient Prescriptions:     predniSONE (STERAPRED) 5 mg dose pack, See administration instruction per 5mg dose pack, Disp: 21 Tab, Rfl: 0    pramoxine-calamine (CALADRYL) 1-8 % lotion, Apply  to affected area three (3) times daily. , Disp: 180 mL, Rfl: 0    augmented betamethasone dipropionate (DIPROLENE-AF) 0.05 % ointment, Apply  to affected area two (2) times a day., Disp: 15 g, Rfl: 0    tavaborole (KERYDIN) 5 % moise, 1 Drop by Apply Externally route daily for 180 days. , Disp: 1 Bottle, Rfl: 2    montelukast (SINGULAIR) 10 mg tablet, Take 1 Tab by mouth daily for 180 days. , Disp: 90 Tab, Rfl: 1    ezetimibe-simvastatin (VYTORIN) 10-40 mg per tablet, Take 1 Tab by mouth nightly., Disp: 90 Tab, Rfl: 3    rizatriptan (MAXALT) 10 mg tablet, Take 1 Tab by mouth once as needed for Migraine. May repeat in 2 hours if needed  Indications: MIGRAINE, Disp: 6 Tab, Rfl: 1    estradiol (ESTRACE) 1 mg tablet, Take 1 Tab by mouth daily. , Disp: 90 Tab, Rfl: 3    EPINEPHrine (EPIPEN) 0.3 mg/0.3 mL injection, 0.3 mL by IntraMUSCular route once as needed for up to 1 dose., Disp: 3 Syringe, Rfl: 1    calcium citrate-vitamin D3 (CITRACAL WITH VITAMIN D MAXIMUM) tablet, Take  by mouth two (2) times a day., Disp: , Rfl:     ibuprofen (MOTRIN) 800 mg tablet, Take  by mouth., Disp: , Rfl:    omega-3 fatty acids-vitamin e (FISH OIL) 1,000 mg cap, Take 1 Cap by mouth., Disp: , Rfl:     coenzyme q10 (CO Q-10) 10 mg cap, Take  by mouth., Disp: , Rfl:     aspirin 81 mg chewable tablet, Take 81 mg by mouth daily. , Disp: , Rfl:    Date Last Reviewed:  5/3/2018     EXAMINATION:   Observation/Orthostatic Postural Assessment:    · Decreased lordotic curvature        · Elevated left iliac crest   · Left LE shorter than right (supine)    Palpation:          Point tenderness of lateral and anterior deltoid left shoulder   ROM:            Lumbar spine Date:  3/30/18 Date:     Direction  Parameters Parameters   Flexion  100%    Extension  100%    Rotation  R: 75%  L: 75%    Side bending  R: 75%  L: 75%    Hip IR R: WNL  L: restricted     Hip ER R: WNL  L: WNL           Shoulder ROM  DATE  3/10/18 DATE     flexion R: WNL  L: mild limitation cs R:   L:    abd R: WNL  L: WNL cs R:   L:    External rotation  R:  WNL  L: WNL R:   L:    Internal rotation R: WNL  L: restricted cs  R:   L:        Strength:           Shoulder ROM  DATE  3/30/18 DATE     flexion R: 5/5   L: 4+  R:   L:    abd R: 5  L: 4+ R:   L:    External rotation  R: 5  L: 5 R:   L:    Internal rotation R: 5  L: 5 R:   L:    Elbow flex/ext R: 5  L: 5 R:   L:                Lower quadrant    DATE  3/30/18 DATE     Hip flexion R: 5/5  L: 4+/5 R:   L:    Hip Abduction  R: 5/5  L: 5/5 R:   L:    Hip Extension  R: 5/5  L: 5/5  R:   L:    Knee Flexion  R: 5/5  L: 5/5 R:   L:    Knee Extension  R: 5/5  L: 5/5 R:   L:    Ankle Dorsiflexion  R: 5/5  L:4+/5 R:   L:   Ankle Plantarflexion  R: 5/5  L: 4+/5 R:  L:          Tool Used: Disabilities of the Arm, Shoulder and Hand (DASH) Questionnaire - Quick Version  Score:  Initial: 17/55  Most Recent: X/55 (Date: -- )   Interpretation of Score: The DASH is designed to measure the activities of daily living in person's with upper extremity dysfunction or pain.   Each section is scored on a 1-5 scale, 5 representing the greatest disability. The scores of each section are added together for a total score of 55. Special Tests:          (-) Sciatic tension B   Neurological Screen:        Normal   Functional Mobility:         Gait/Ambulation: slightly asymmetrical with left hip in ER        Transfers:  Sit to stand without UE        Bed Mobility:  Normal         Stairs:  Not assessed  ·   Patient is expected to demonstrate progress in strength and range of motion to increase independence with ADLs and recreational activities . Reason for Services/Other Comments:  · Patient continues to require modification of therapeutic interventions to increase complexity of exercises. TREATMENT:   (In addition to Assessment/Re-Assessment sessions the following treatments were rendered)  Pre-treatment Symptoms/Complaints: pt states that her shoulder is intermittent and very mild in nature (not limiting mobility recently) pt states that after the rotational mobs to pelvis last session, she felt \"locked\" of her low back for last few days and states relief with flexeril. No pain currently      Pain: Initial:     0/10 hip, 0/10 left shoulder     Post Session: no c/o's   Objective: pt demonstrates left IC elevated, left PSIS lower than right, and increased tone of left QL and paraspinals    Therapeutic Exercise: (  25 min ):  Exercises per grid below to improve mobility and strength. Required moderate visual, verbal and manual cues to promote proper body alignment, promote proper body posture and promote proper body mechanics. Progressed resistance, range and repetitions as indicated.      Date:  4/5/18 4/9/18 4/13/18 4/16/18 4/19/18 4/23/18 4/27/18 5/3/18   Activity/Exercise Parameters          Education  Discussed left shoulder mechanics with overhead reaching           Gerardo stretch  At end of mat x 2  Modified to 1/2 kneel hip flexor stretch (pt preference)          gastroc stretch   HEP   3 x 20 sec       nustep  X 10 min level 3  X 10 min level 3    X 10 min level 3.5  X 10 min level 3.5  X 10 min level    t-band ER/IR RTB  x 10  X 10 YTB         t-band extension  RTB x 10  RTB x 10    Cables 7# x 10  Cable 3# x 15     t-band rows  RTB x 10  RTB x 10    Cables 10# x 10  Cables 10#     Table walking (UE wbing) 5 x 3 steps            UBE  2/4 level 1  3/3 level 3  3/4 level 3  4/4 level 3  4/4 level 3      Lateral raise    2# x 10  2# x 10  2# x 10       Shoulder flexion    2# x 10  2# x 10  2# x 10       Shoulder scaption    2 # x 10  2# x 10  2# x 10       Diaphragmatic breathing    X 5 min         Press ups     2# 2 x 5 2# x 6      AROM lumbro sacral        Flex/extension     Clams         X 10 left    Piriformis         3 x 20 sec                  Manual x 30 min   · Left hip flexor release  · Deep MFR to left QL  · Grade IV PA's of left PSIS  · Long distraction of left LE        Modalities:  Ice x 10 min to lumbrosacral region        Treatment/Session Assessment:  Pt demonstrates tightness of QL and left sacral insertions of gluteals. · Response to Treatment:  Decreased tone of left gluteals and sacral musculature. No c/o's    · Compliance with Program/Exercises: Will assess as treatment progresses.   · Recommendations/Intent for next treatment session: manual hip flexor release, gastroc stretching, scap strengthen, manual mobs to decrease impingement, progress HEP as able ,       Future Appointments  Date Time Provider [facility]   5/7/2018 9:30 AM Korene Homans, PT [facility]   5/11/2018 10:15 AM Korene Homans, PT [facility]   5/25/2018 8:00 AM Preston Senior MD [facility]       Total Treatment Duration: 65 min     PT Patient Time In/Time Out  Time In: 9818  Time Out: 1025 [facility], PT

## 2018-05-07 ENCOUNTER — APPOINTMENT (OUTPATIENT)
Dept: PHYSICAL THERAPY | Age: 53
End: 2018-05-07
Attending: FAMILY MEDICINE
Payer: COMMERCIAL

## 2018-05-07 ENCOUNTER — HOSPITAL ENCOUNTER (OUTPATIENT)
Dept: PHYSICAL THERAPY | Age: 53
Discharge: HOME | End: 2018-05-07
Attending: FAMILY MEDICINE
Payer: COMMERCIAL

## 2018-05-07 PROCEDURE — 97140 MANUAL THERAPY 1/> REGIONS: CPT

## 2018-05-07 PROCEDURE — 97110 THERAPEUTIC EXERCISES: CPT

## 2018-05-07 NOTE — PROGRESS NOTES
Sean Bobby  : 1965  Payor: Bryan Gonzalez / [place] BL[place] OF [address] / Product Type: PPO /    [address] at [address] [facility], [address], [place], [address] [place] [address] Road  [phone]   [phone]         OUTPATIENT PHYSICAL THERAPY:Daily Note 2018      ICD-10: Treatment Diagnosis: Pain in left hip (M25.552); difficulty walking (R26.2)  Left shoulder pain (M25.512)    Precautions/Allergies:   Codeine and Percocet [oxycodone-acetaminophen]   Fall Risk Score: 1 (? 5 = High Risk)  MD Orders: evaluate and treat MEDICAL/REFERRING DIAGNOSIS:  Sciatic neuropathy, left  Neck pain   DATE OF ONSET: 2018  REFERRING PHYSICIAN: Amita Saha MD  RETURN PHYSICIAN APPOINTMENT: 18     INITIAL ASSESSMENT:  Ms. Kayode Beyer presents with functional limitations due to left shoulder and hip pain. PT evaluation completed today revealing signs and symptoms consistent with left shoulder impingement, muscle imbalances of upper and lower extremities causing hip and shoulder restriction, as well as leg length discrepancy. Pt would highly benefit from skilled PT to reduce pain and improve overall functional mobility     PROBLEM LIST (Impacting functional limitations):  1. Decreased Strength  2. Decreased ADL/Functional Activities  3. Decreased Ambulation Ability/Technique  4. Increased Pain  5. Decreased Flexibility/Joint Mobility  6. Decreased Knowledge of Precautions  7. Decreased Leopold with Home Exercise Program INTERVENTIONS PLANNED:  1. Cold  2. Gait Training  3. Home Exercise Program (HEP)  4. Manual Therapy  5. Neuromuscular Re-education/Strengthening  6. Range of Motion (ROM)  7. Therapeutic Exercise/Strengthening     TREATMENT PLAN:  Effective Dates: 3/30/2018 TO 2018 (90 days).  Frequency/Duration: 2 times a week for 60 Days    GOALS: (Goals have been discussed and agreed upon with patient.)  SHORT-TERM FUNCTIONAL GOALS: Time Frame: 2-4 weeks 1. Pt will be independent with HEP. (met)  2. Pt will report sleep is no longer disrupted by pain of hip or shoulder on regular basis. (ongoing)  3. Pt will report pain of left shoulder is no longer constant. (met)  4. Pt will complete functional outcome measure.(met-DASH)  DISCHARGE GOALS: Time Frame: 6-8 weeks   1. Pt will improve LEFS/DASH score by at least 10 points. 2. Pt will report ability to walk 30 + minutes without limitation of left hip pain. (  Rehabilitation Potential For Stated Goals: Good              The information in this section was collected on 3/30/18 (except where otherwise noted). HISTORY:   History of Present Injury/Illness (Reason for Referral):  Pt states that she was reaching out of car window 3 months ago to put a package in mailbox and felt intense sharp pain left left shoulder into neck. Swelling followed but resolved quickly. She denies numbness, tingling or bruising. She states since incident, her pain has localized to lateral shoulder but is constant and disrupting sleep. No imaging at this time. Pt also reports chronic off and on right hip pain, previously treated by PT in past with good results. Recently, pain has been returning and currently also reports left ankle feels a numb sensation with hip pain increase, but no pain of thigh or knee. She states a niraj injury of left ankle several years ago, leaving decreased mobility. Present symptoms (on day of initial evaluation): constant left lateral shoulder pain localized.  popping and decreased ROM in IR    Lateral left hip pain leading to Numbness of left ankle  · Aggravating factors: (shoulder)sleeping position, rolling on left side                                          (hip) sitting prolonged, sit stand, prolonged walking (has ankle injury-old with restriction)        · Relieving factors: prednisone, avoiding use of arm and hip     · Pain level: 3/10 present, 6/10 worst, 3/10 best      Past Medical History/Comorbidities:   Ms. Rose Marie Irving  has a past medical history of Depression; HTN (hypertension)   Low Risk (1/8/2016); Low serum vitamin D; Migraines; and Mixed hyperlipidemia (1/8/2016). Ms. Rose Marie Irving  has a past surgical history that includes hx orthopaedic; hx gyn; and hx colonoscopy. Social History/Living Environment:     lives with  2 story home   Prior Level of Function/Work/Activity:  Retired, walking in neighborhood, therapeutic riding, gardening   Dominant Side:         RIGHT  Other Clinical Tests:          none  Previous Treatment Approaches:          PT for hip pain   Current Medications:       Current Outpatient Prescriptions:     predniSONE (STERAPRED) 5 mg dose pack, See administration instruction per 5mg dose pack, Disp: 21 Tab, Rfl: 0    pramoxine-calamine (CALADRYL) 1-8 % lotion, Apply  to affected area three (3) times daily. , Disp: 180 mL, Rfl: 0    augmented betamethasone dipropionate (DIPROLENE-AF) 0.05 % ointment, Apply  to affected area two (2) times a day., Disp: 15 g, Rfl: 0    tavaborole (KERYDIN) 5 % moise, 1 Drop by Apply Externally route daily for 180 days. , Disp: 1 Bottle, Rfl: 2    montelukast (SINGULAIR) 10 mg tablet, Take 1 Tab by mouth daily for 180 days. , Disp: 90 Tab, Rfl: 1    ezetimibe-simvastatin (VYTORIN) 10-40 mg per tablet, Take 1 Tab by mouth nightly., Disp: 90 Tab, Rfl: 3    rizatriptan (MAXALT) 10 mg tablet, Take 1 Tab by mouth once as needed for Migraine. May repeat in 2 hours if needed  Indications: MIGRAINE, Disp: 6 Tab, Rfl: 1    estradiol (ESTRACE) 1 mg tablet, Take 1 Tab by mouth daily. , Disp: 90 Tab, Rfl: 3    EPINEPHrine (EPIPEN) 0.3 mg/0.3 mL injection, 0.3 mL by IntraMUSCular route once as needed for up to 1 dose., Disp: 3 Syringe, Rfl: 1    calcium citrate-vitamin D3 (CITRACAL WITH VITAMIN D MAXIMUM) tablet, Take  by mouth two (2) times a day., Disp: , Rfl:     ibuprofen (MOTRIN) 800 mg tablet, Take  by mouth., Disp: , Rfl:    omega-3 fatty acids-vitamin e (FISH OIL) 1,000 mg cap, Take 1 Cap by mouth., Disp: , Rfl:     coenzyme q10 (CO Q-10) 10 mg cap, Take  by mouth., Disp: , Rfl:     aspirin 81 mg chewable tablet, Take 81 mg by mouth daily. , Disp: , Rfl:    Date Last Reviewed:  5/7/2018     EXAMINATION:   Observation/Orthostatic Postural Assessment:    · Decreased lordotic curvature        · Elevated left iliac crest   · Left LE shorter than right (supine)    Palpation:          Point tenderness of lateral and anterior deltoid left shoulder   ROM:            Lumbar spine Date:  3/30/18 Date:     Direction  Parameters Parameters   Flexion  100%    Extension  100%    Rotation  R: 75%  L: 75%    Side bending  R: 75%  L: 75%    Hip IR R: WNL  L: restricted     Hip ER R: WNL  L: WNL           Shoulder ROM  DATE  3/10/18 DATE     flexion R: WNL  L: mild limitation cs R:   L:    abd R: WNL  L: WNL cs R:   L:    External rotation  R:  WNL  L: WNL R:   L:    Internal rotation R: WNL  L: restricted cs  R:   L:        Strength:           Shoulder ROM  DATE  3/30/18 DATE     flexion R: 5/5   L: 4+  R:   L:    abd R: 5  L: 4+ R:   L:    External rotation  R: 5  L: 5 R:   L:    Internal rotation R: 5  L: 5 R:   L:    Elbow flex/ext R: 5  L: 5 R:   L:                Lower quadrant    DATE  3/30/18 DATE     Hip flexion R: 5/5  L: 4+/5 R:   L:    Hip Abduction  R: 5/5  L: 5/5 R:   L:    Hip Extension  R: 5/5  L: 5/5  R:   L:    Knee Flexion  R: 5/5  L: 5/5 R:   L:    Knee Extension  R: 5/5  L: 5/5 R:   L:    Ankle Dorsiflexion  R: 5/5  L:4+/5 R:   L:   Ankle Plantarflexion  R: 5/5  L: 4+/5 R:  L:          Tool Used: Disabilities of the Arm, Shoulder and Hand (DASH) Questionnaire - Quick Version  Score:  Initial: 17/55  Most Recent: X/55 (Date: -- )   Interpretation of Score: The DASH is designed to measure the activities of daily living in person's with upper extremity dysfunction or pain.   Each section is scored on a 1-5 scale, 5 representing the greatest disability. The scores of each section are added together for a total score of 55. Special Tests:          (-) Sciatic tension B   Neurological Screen:        Normal   Functional Mobility:         Gait/Ambulation: slightly asymmetrical with left hip in ER        Transfers:  Sit to stand without UE        Bed Mobility:  Normal         Stairs:  Not assessed  ·   Patient is expected to demonstrate progress in strength and range of motion to increase independence with ADLs and recreational activities . Reason for Services/Other Comments:  · Patient continues to require modification of therapeutic interventions to increase complexity of exercises. TREATMENT:   (In addition to Assessment/Re-Assessment sessions the following treatments were rendered)  Pre-treatment Symptoms/Complaints: pt reports mild posterior left LE pain mimicking sciatic pattern which she i. Pt states that she continues to progress and has no anterior hip or shoulder pain present today. She also is unsure if she is doing her HEP correctly. Pain: Initial:     0/10 hip, 0/10 left shoulder   2/10 posterior LE pain current     Post Session: 0/10   Objective: pt demonstrates mild left IC elevated, left PSIS lower than right, and increased tone of left QL and paraspinals    Therapeutic Exercise: (  20 min ):  Exercises per grid below to improve mobility and strength. Required moderate visual, verbal and manual cues to promote proper body alignment, promote proper body posture and promote proper body mechanics. Progressed resistance, range and repetitions as indicated.      Date:  4/5/18 4/9/18 4/13/18 4/16/18 4/19/18 4/23/18 4/27/18 5/3/18 5/7/18   Activity/Exercise Parameters           Education  Discussed left shoulder mechanics with overhead reaching            Gerardo stretch  At end of mat x 2  Modified to 1/2 kneel hip flexor stretch (pt preference)           gastroc stretch   HEP   3 x 20 sec        nustep  X 10 min level 3  X 10 min level 3    X 10 min level 3.5  X 10 min level 3.5  X 10 min level 6 X 15 min level 6    t-band ER/IR RTB  x 10  X 10 YTB          t-band extension  RTB x 10  RTB x 10    Cables 7# x 10  Cable 3# x 15      t-band rows  RTB x 10  RTB x 10    Cables 10# x 10  Cables 10#      Table walking (UE wbing) 5 x 3 steps             UBE  2/4 level 1  3/3 level 3  3/4 level 3  4/4 level 3  4/4 level 3        Lateral raise    2# x 10  2# x 10  2# x 10        Shoulder flexion    2# x 10  2# x 10  2# x 10        Shoulder scaption    2 # x 10  2# x 10  2# x 10        Diaphragmatic breathing    X 5 min          Press ups     2# 2 x 5 2# x 6       AROM lumbro sacral        Flex/extension      Clams         X 10 left  X 10 left    Piriformis         3 x 20 sec  3 x 20 sec   Sciatic glide (supine)         2 x 10 ankle pumps        Manual x 25 min   · Left hip flexor release  · Deep MFR to left QL  · Grade IV PA's of left PSIS       Modalities:  Ice x 10 min to lumbrosacral region        Treatment/Session Assessment:  Pt demonstrates tightness of QL and left sacral insertions of gluteals. Some cuing needed for clam exercises. Added sciatic nerve glide to decrease nerve adhesions. · Response to Treatment:  No pain at end of session today. · Compliance with Program/Exercises: Will assess as treatment progresses.   · Recommendations/Intent for next treatment session: manual hip flexor release, gastroc stretching, scap strengthen, manual mobs to decrease impingement, progress HEP as able ,       Future Appointments  Date Time Provider Liana Bates   5/11/2018 10:15 AM Juan Tapia, PT [facility]   5/25/2018 8:00 AM Rosie Donovan MD [facility]       Total Treatment Duration: 55 min     PT Patient Time In/Time Out  Time In: 0935  Time Out: [address], PT

## 2018-05-10 ENCOUNTER — APPOINTMENT (OUTPATIENT)
Dept: PHYSICAL THERAPY | Age: 53
End: 2018-05-10
Attending: FAMILY MEDICINE
Payer: COMMERCIAL

## 2018-05-11 ENCOUNTER — HOSPITAL ENCOUNTER (OUTPATIENT)
Dept: PHYSICAL THERAPY | Age: 53
Discharge: HOME | End: 2018-05-11
Attending: FAMILY MEDICINE
Payer: COMMERCIAL

## 2018-05-11 PROCEDURE — 97164 PT RE-EVAL EST PLAN CARE: CPT

## 2018-05-11 PROCEDURE — 97110 THERAPEUTIC EXERCISES: CPT

## 2018-05-11 NOTE — PROGRESS NOTES
Favian Diamond  : 1965  Payor: Helene Calero / [place] BL[place] OF [address] / Product Type: PPO /    [address] at [place] [address], [place], [address] [place] [address] Road  [phone]   [phone]         OUTPATIENT PHYSICAL THERAPY:Daily Note and Discharge 2018      ICD-10: Treatment Diagnosis: Pain in left hip (M25.552); difficulty walking (R26.2)  Left shoulder pain (M25.512)    Precautions/Allergies:   Codeine and Percocet [oxycodone-acetaminophen]   Fall Risk Score: 1 (? 5 = High Risk)  MD Orders: evaluate and treat MEDICAL/REFERRING DIAGNOSIS:  Sciatic neuropathy, left  Neck pain   DATE OF ONSET: 2018  REFERRING PHYSICIAN: Rex Silverio MD  RETURN PHYSICIAN APPOINTMENT: 18     INITIAL ASSESSMENT:  Ms. Damian Fernandes has attended 11 out of 11 scheduled PT visits for treatment of above diagnosis. Overall, pt has progressed very well and has met all of her therapy goals. She is independent with a progressive HEP, pain free currently and is ready for discharge. Thank you for this referral,    Damian Johnson, PT           GOALS: (Goals have been discussed and agreed upon with patient.)  SHORT-TERM FUNCTIONAL GOALS:   1. Pt will be independent with HEP. (met)  2. Pt will report sleep is no longer disrupted by pain of hip or shoulder on regular basis. (met)  3. Pt will report pain of left shoulder is no longer constant. (met)  4. Pt will complete functional outcome measure.(met)  DISCHARGE GOALS:    1. Pt will improve LEFS/DASH score (met)  2. Pt will report ability to walk 30 + minutes without limitation of left hip pain. (met)                The information in this section was collected on 3/30/18 (except where otherwise noted).   HISTORY:   History of Present Injury/Illness (Reason for Referral):  Pt states that she was reaching out of car window 3 months ago to put a package in mailbox and felt intense sharp pain left left shoulder into neck. Swelling followed but resolved quickly. She denies numbness, tingling or bruising. She states since incident, her pain has localized to lateral shoulder but is constant and disrupting sleep. No imaging at this time. Pt also reports chronic off and on right hip pain, previously treated by PT in past with good results. Recently, pain has been returning and currently also reports left ankle feels a numb sensation with hip pain increase, but no pain of thigh or knee. She states a niraj injury of left ankle several years ago, leaving decreased mobility. Present symptoms (on day of initial evaluation): constant left lateral shoulder pain localized. popping and decreased ROM in IR    Lateral left hip pain leading to Numbness of left ankle  · Aggravating factors: (shoulder)sleeping position, rolling on left side                                          (hip) sitting prolonged, sit stand, prolonged walking (has ankle injury-old with restriction)        · Relieving factors: prednisone, avoiding use of arm and hip     · Pain level: 3/10 present, 6/10 worst, 3/10 best      Past Medical History/Comorbidities:   Ms. Carter Sorensen  has a past medical history of Depression; HTN (hypertension)   Low Risk (1/8/2016); Low serum vitamin D; Migraines; and Mixed hyperlipidemia (1/8/2016). Ms. Carter Sorensen  has a past surgical history that includes hx orthopaedic; hx gyn; and hx colonoscopy.   Social History/Living Environment:     lives with  2 story home   Prior Level of Function/Work/Activity:  Retired, walking in neighborhood, therapeutic riding, gardening   Dominant Side:         RIGHT  Other Clinical Tests:          none  Previous Treatment Approaches:          PT for hip pain   Current Medications:       Current Outpatient Prescriptions:     predniSONE (STERAPRED) 5 mg dose pack, See administration instruction per 5mg dose pack, Disp: 21 Tab, Rfl: 0    pramoxine-calamine (CALADRYL) 1-8 % lotion, Apply  to affected area three (3) times daily. , Disp: 180 mL, Rfl: 0    augmented betamethasone dipropionate (DIPROLENE-AF) 0.05 % ointment, Apply  to affected area two (2) times a day., Disp: 15 g, Rfl: 0    tavaborole (KERYDIN) 5 % moise, 1 Drop by Apply Externally route daily for 180 days. , Disp: 1 Bottle, Rfl: 2    montelukast (SINGULAIR) 10 mg tablet, Take 1 Tab by mouth daily for 180 days. , Disp: 90 Tab, Rfl: 1    ezetimibe-simvastatin (VYTORIN) 10-40 mg per tablet, Take 1 Tab by mouth nightly., Disp: 90 Tab, Rfl: 3    rizatriptan (MAXALT) 10 mg tablet, Take 1 Tab by mouth once as needed for Migraine. May repeat in 2 hours if needed  Indications: MIGRAINE, Disp: 6 Tab, Rfl: 1    estradiol (ESTRACE) 1 mg tablet, Take 1 Tab by mouth daily. , Disp: 90 Tab, Rfl: 3    EPINEPHrine (EPIPEN) 0.3 mg/0.3 mL injection, 0.3 mL by IntraMUSCular route once as needed for up to 1 dose., Disp: 3 Syringe, Rfl: 1    calcium citrate-vitamin D3 (CITRACAL WITH VITAMIN D MAXIMUM) tablet, Take  by mouth two (2) times a day., Disp: , Rfl:     ibuprofen (MOTRIN) 800 mg tablet, Take  by mouth., Disp: , Rfl:     omega-3 fatty acids-vitamin e (FISH OIL) 1,000 mg cap, Take 1 Cap by mouth., Disp: , Rfl:     coenzyme q10 (CO Q-10) 10 mg cap, Take  by mouth., Disp: , Rfl:     aspirin 81 mg chewable tablet, Take 81 mg by mouth daily. , Disp: , Rfl:    Date Last Reviewed:  5/11/2018     EXAMINATION:5/11/18   Observation/Orthostatic Postural Assessment:    · Decreased lordotic curvature        · Elevated left iliac crest (very mild)        ROM:            Lumbar spine Date:  3/30/18 Date:   5/11/18   Direction  Parameters Parameters   Flexion  100% 100%   Extension  100% 100%   Rotation  R: 75%  L: 75% 75%  75%   Side bending  R: 75%  L: 75% R: 75%  L: 75%   Hip IR R: WNL  L: restricted  R: WNL  L: WNL   Hip ER R: WNL  L: WNL R: WNL  L: WNL          Shoulder ROM  DATE  3/10/18 DATE  5/11/18   flexion R: WNL  L: mild limitation cs R: WNL  L: WNL   abd R: WNL  L: WNL cs R: WNL  L: WNL   External rotation  R:  WNL  L: WNL R: WNL  L: WNL   Internal rotation R: WNL  L: restricted cs  R: WNL  L: WNL       Strength:           Shoulder ROM  DATE  3/30/18 DATE  5/11/18   flexion R: 5/5   L: 4+  R: 5  L: 5   abd R: 5  L: 4+ R: 5  L: 5   External rotation  R: 5  L: 5 R: 5  L: 5   Internal rotation R: 5  L: 5 R: 5  L: 5   Elbow flex/ext R: 5  L: 5 R: 5  L: 5               Lower quadrant    DATE  3/30/18 DATE  5/11/18   Hip flexion R: 5/5  L: 4+/5 R: 5/5   L: 5/5   Hip Abduction  R: 5/5  L: 5/5 R: 5/5  L: 5/5   Hip Extension  R: 5/5  L: 5/5  R: 5/5  L: 5/5   Knee Flexion  R: 5/5  L: 5/5 R: 5/5  L: 5/5   Knee Extension  R: 5/5  L: 5/5 R: 5/5  L: 5/5   Ankle Dorsiflexion  R: 5/5  L:4+/5 R: 5/5  L: 5/5   Ankle Plantarflexion  R: 5/5  L: 4+/5 R: 5/5  L: 5/5          Tool Used: Disabilities of the Arm, Shoulder and Hand (DASH) Questionnaire - Quick Version  Score:  Initial: 17/55  Most Recent: 13/55 (Date: -- )   Interpretation of Score: The DASH is designed to measure the activities of daily living in person's with upper extremity dysfunction or pain. Each section is scored on a 1-5 scale, 5 representing the greatest disability. The scores of each section are added together for a total score of 55. Special Tests:          (-) Sciatic tension B   Neurological Screen:        Normal   Functional Mobility:         Gait/Ambulation: normal pattern, symmetry noted         Transfers:  Sit to stand without UE        Bed Mobility:  Normal         Stairs:  Not assessed                TREATMENT:   (In addition to Assessment/Re-Assessment sessions the following treatments were rendered)  Pre-treatment Symptoms/Complaints: pt reports that she is doing very well and has no pain currently. Sleep no longer disrupted. Doing HEP regularly and fined relief from exercise. Able to walk for greater than 30 min  Without hip pain. Pt voices readiness for discharge. Pain: Initial:     0/10 hip, 0/10 left shoulder       Post Session: 0/10   Objective: pt demonstrates mild left IC elevated, l    Therapeutic Exercise: (  10 min ):  Exercises per grid below to improve mobility and strength. Required moderate visual, verbal and manual cues to promote proper body alignment, promote proper body posture and promote proper body mechanics. Progressed resistance, range and repetitions as indicated. 4/9/18 4/13/18 4/16/18 4/19/18 4/23/18 4/27/18 5/3/18 5/7/18 5/11/18   Activity/Exercise            Education             Gerardo stretch  Modified to 1/2 kneel hip flexor stretch (pt preference)            gastroc stretch  HEP   3 x 20 sec         nustep  X 10 min level 3    X 10 min level 3.5  X 10 min level 3.5  X 10 min level 6 X 15 min level 6  X 10 min level 6   t-band ER/IR X 10 YTB           t-band extension  RTB x 10    Cables 7# x 10  Cable 3# x 15       t-band rows  RTB x 10    Cables 10# x 10  Cables 10#       Table walking (UE wbing)             UBE 2/4 level 1  3/3 level 3  3/4 level 3  4/4 level 3  4/4 level 3         Lateral raise   2# x 10  2# x 10  2# x 10         Shoulder flexion   2# x 10  2# x 10  2# x 10         Shoulder scaption   2 # x 10  2# x 10  2# x 10         Diaphragmatic breathing   X 5 min           Press ups    2# 2 x 5 2# x 6        AROM lumbro sacral       Flex/extension       Clams        X 10 left  X 10 left     Piriformis        3 x 20 sec  3 x 20 sec    Sciatic glide (supine)        2 x 10 ankle pumps         Manual x 25 min   · Left hip flexor release  · Deep MFR to left QL  · Grade IV PA's of left PSIS     Modalities:  Ice x 10 min to lumbrosacral region        Treatment/Session Assessment:  Pt demonstrates tightness of QL and left sacral insertions of gluteals. Some cuing needed for clam exercises. Added sciatic nerve glide to decrease nerve adhesions. · Response to Treatment:  No pain at end of session today.     · Compliance with Program/Exercises: Will assess as treatment progresses.   · Recommendations/Intent for next treatment session: manual hip flexor release, gastroc stretching, scap strengthen, manual mobs to decrease impingement, progress HEP as able ,       Future Appointments  Date Time Provider Liana Jana   5/29/2018 9:15 AM Irish Monk MD [facility]       Total Treatment Duration: 10 min, re-evaluation: 30 min    PT Patient Time In/Time Out  Time In: 8756  Time Out: [address] [facility],[address], [facility]

## 2018-07-23 ENCOUNTER — HOSPITAL ENCOUNTER (OUTPATIENT)
Dept: MAMMOGRAPHY | Age: 53
Discharge: HOME | End: 2018-07-23
Attending: FAMILY MEDICINE
Payer: COMMERCIAL

## 2018-07-23 DIAGNOSIS — Z12.39 SCREENING FOR BREAST CANCER: ICD-10-CM

## 2018-07-23 PROCEDURE — 77067 SCR MAMMO BI INCL CAD: CPT

## 2019-04-25 ENCOUNTER — HOSPITAL ENCOUNTER (OUTPATIENT)
Dept: MAMMOGRAPHY | Age: 54
Discharge: HOME | End: 2019-04-25
Attending: NURSE PRACTITIONER
Payer: COMMERCIAL

## 2019-04-25 DIAGNOSIS — Z13.820 OSTEOPOROSIS SCREENING: ICD-10-CM

## 2019-04-25 PROCEDURE — 77080 DXA BONE DENSITY AXIAL: CPT

## 2019-07-25 ENCOUNTER — HOSPITAL ENCOUNTER (OUTPATIENT)
Dept: MAMMOGRAPHY | Age: 54
Discharge: HOME | End: 2019-07-25
Attending: FAMILY MEDICINE

## 2019-07-25 DIAGNOSIS — Z12.39 SCREENING FOR BREAST CANCER: ICD-10-CM

## 2020-07-27 ENCOUNTER — HOSPITAL ENCOUNTER (OUTPATIENT)
Dept: MAMMOGRAPHY | Age: 55
Discharge: HOME | End: 2020-07-27
Attending: FAMILY MEDICINE

## 2020-07-27 DIAGNOSIS — Z12.31 VISIT FOR SCREENING MAMMOGRAM: ICD-10-CM

## 2020-12-11 ENCOUNTER — HOSPITAL ENCOUNTER (OUTPATIENT)
Dept: PHYSICAL THERAPY | Age: 55
Discharge: HOME | End: 2020-12-11
Payer: COMMERCIAL

## 2020-12-11 PROCEDURE — 97161 PT EVAL LOW COMPLEX 20 MIN: CPT

## 2020-12-11 PROCEDURE — 97140 MANUAL THERAPY 1/> REGIONS: CPT

## 2020-12-11 PROCEDURE — 97110 THERAPEUTIC EXERCISES: CPT

## 2020-12-11 NOTE — THERAPY EVALUATION
Rojelio Sharma  : 1965      Payor: Damian Hines / Plan: [facility] / Product Type: PPO /    [address] [facility] at [address] [place] [address]. [address] [place] [address]  [phone]   [phone]              OUTPATIENT PHYSICAL THERAPY:Initial Assessment: 2020    ICD-10: Treatment Diagnosis:   Pain in right elbow (M25.521)Stiffness of right elbow, not elsewhere classified (M25.621)Other specified disorders of tendon, right elbow (L79.094)        Precautions/Allergies:   Codeine and Percocet [oxycodone-acetaminophen]   MD Orders: Eval and Treat  MEDICAL/REFERRING DIAGNOSIS:  other specified disorders of tendon, right elbow  pain in right elbow   DATE OF ONSET: 3-4 weeks ago   REFERRING PHYSICIAN: Karin Isaacs MD  RETURN PHYSICIAN APPOINTMENT: TBD by patient      INITIAL ASSESSMENT:   Ms. Alfie Ritchie presents to physical therapy with decreased functional mobility due to right elbow pain. S/s suggesting tendinitis of distal biceps Patient will benefit from skilled physical therapy for manual therapeutic techniques (as appropriate), therapeutic exercises and activities, neuromuscular re-education, and comprehensive home exercises program to address current impairments and functional limitations. Rojelio Sharma will benefit from skilled PT (medically necessary) in order to address above deficits affecting participation in basic ADLs and overall functional tolerance. PROBLEM LIST (Impacting functional limitations):  · Decreased Strength  · Decreased ADL/Functional Activities  · Increased Pain  · Decreased Activity Tolerance  · Increased Shortness of Breath  · Decreased Flexibility/Joint Mobility  · Decreased Butts with Home Exercise Program INTERVENTIONS PLANNED:  1. Cold  2. Family Education  3. Home Exercise Program (HEP)  4. Manual Therapy  5. Neuromuscular Re-education/Strengthening  6. Range of Motion (ROM)  7.  Therapeutic Activites  8. Therapeutic Exercise/Strengthening  9. Transfer Training  10. Ultrasound   TREATMENT PLAN:  Effective Dates: 12/11/2020 TO 2/9/2021 (60 days). Frequency/Duration: 2 times a week for 60 Days  GOALS: (Goals have been discussed and agreed upon with patient.)     Short-Term Goals~4 weeks  Goal Met   1. Arpit Glez will report pain no longer constant. 1.  [] Date:   2. Arpit Glez will demonstrate painfree active elbow flexion and extension to increase UE function and participation in ADLs. 2.  [] Date:   3. Arpit Glez will report pain no longer disrupts sleep over a 1-2 week time 3. [] Date:   4. Arpit Glez will show a greater than 8 point decrease on the DASH in order to show an increase in upper extremity function. 4.  [] Date:   5. Arpit Glez will be independent in all HEP using weights/bands for strengthening. 5.  [] Date:   6.  6.  [] Date:         Long Term Goals~8 weeks Goal Met   1. Arpit Glez will show full AROM of the right elbow wrist and forearm in order to return to full functional mobility  1. [] Date:   2. Arpit Glez will show a greater than 15 point decrease on the DASH in order to show an increase in upper extremity function 2. [] Date:   3. Arpit Glez will report return to exercise routine using weights without limitations of right elbow pain. 3.  [] Date:   4. Arpit Glez will improve  strength of right to 60 lbs or greater without pain. 4.  [] Date:            Outcome Measure: Tool Used: Disabilities of the Arm, Shoulder and Hand (DASH) Questionnaire - Quick Version  Score:  Initial: 28/55  Most Recent: X/55 (Date: -- )   Interpretation of Score: The DASH is designed to measure the activities of daily living in person's with upper extremity dysfunction or pain. Each section is scored on a 1-5 scale, 5 representing the greatest disability.   The scores of each section are added together for a total score of 55. Medical Necessity:   · Skilled intervention continues to be required due to deficits and impairments seen upon initial evaluation affecting patient's participation in ADLs and functional tasks. Reason for Services/Other Comments:  · Patient continues to require skilled intervention due to deficits and impairments seen upon initial evaluation affecting patient's participation in ADLs and functional tasks. Total Treatment Duration:  30 min evaluation +  30 min treatment   PT Patient Time In/Time Out  Time In: 0900  Time Out: 1015    Rehabilitation Potential For Stated Goals: good  Regarding Adelaida Dugan's therapy, I certify that the treatment plan above will be carried out by a therapist or under their direction. Thank you for this referral,  Eduardo Green PT     Referring Physician Signature: Herb Angulo MD              Date                    HISTORY:   History of Present Injury/Illness (Reason for Referral):  Pt states that she was lifting weights over the last couple months and pain of right elbow began. Over next few weeks, pain worsening in intensity and irritability and is now constant radaiting frequently to other parts of arm. Some pain radiating into right scap. Denies numbness and weakness. Many activities very painful. Went to MD referral to PT, no imaging as of yet. -Present symptoms/complaints (on day of evaluation): sharp during movement, constant aching,  Anterior and posterior- lateral elbow and forarm.  W  Pain Scale:  · Current: 3/10  · Best: 3/10  · Worst: 8/10  · Aggravating factors: Carrying and push/pull writing, carrying groceries   · Relieving factors: support the arm  · Irritability: High (onset of Pain is shorter than alleviation of Pain)    Dominant Side:  right  Past Medical History/Comorbidities:   Ms. Betsy Hendrickson  has a past medical history of Depression, HTN (hypertension)   Low Risk (1/8/2016), Low serum vitamin D, Migraines, and Mixed hyperlipidemia (1/8/2016). Ms. Hannah Michael  has a past surgical history that includes hx orthopaedic; hx gyn; hx colonoscopy; and hx hysterectomy. Social History/Living Environment:    lives with    Prior Level of Function/Work/Activity:  Unable to resume workout routine  Other Clinical Tests:         none  Current Medications:    Current Outpatient Medications:     suvorexant (BELSOMRA) 10 mg tablet, Take  by mouth nightly as needed for Insomnia., Disp: , Rfl:     ibuprofen (MOTRIN) 600 mg tablet, Take 1 Tab by mouth two (2) times a day., Disp: 30 Tab, Rfl: 0    tavaborole (KERYDIN) 5 % moise, by Apply Externally route., Disp: , Rfl:     ezetimibe-simvastatin (VYTORIN) 10-40 mg per tablet, Take 1 Tab by mouth nightly., Disp: 90 Tab, Rfl: 3    estradiol (ESTRACE) 1 mg tablet, Take 1 Tab by mouth daily. , Disp: 90 Tab, Rfl: 3    augmented betamethasone dipropionate (DIPROLENE-AF) 0.05 % ointment, Apply  to affected area two (2) times a day., Disp: 15 g, Rfl: 1    EPINEPHrine (EPIPEN) 0.3 mg/0.3 mL injection, 0.3 mL by IntraMUSCular route once as needed for up to 1 dose., Disp: 2 Syringe, Rfl: 0    calcium citrate-vitamin D3 (CITRACAL WITH VITAMIN D MAXIMUM) tablet, Take  by mouth two (2) times a day., Disp: , Rfl:     omega-3 fatty acids-vitamin e (FISH OIL) 1,000 mg cap, Take 1 Cap by mouth., Disp: , Rfl:     coenzyme q10 (CO Q-10) 10 mg cap, Take  by mouth., Disp: , Rfl:     aspirin 81 mg chewable tablet, Take 81 mg by mouth daily. , Disp: , Rfl:         Ambulatory/Rehab Services H2 Model Falls Risk Assessment    Risk Factors:       No Risk Factors Identified Ability to Rise from Chair:       (0)  Ability to rise in a single movement    Falls Prevention Plan:       No modifications necessary   Total: (5 or greater = High Risk): 0    ©2010 [facility]. All Rights Reserved. [facility] Patent #[id number].  Federal Law prohibits the replication, distribution or use without written permission from [facility] [employer]       Date Last Reviewed:  12/11/2020   Number of Personal Factors/Comorbidities that affect the Plan of Care: 0: LOW COMPLEXITY   EXAMINATION:   Observation/Orthostatic Postural Assessment: Forward Head and Rounded Shoulders  Palpation:          Tender over bicep muscle belly, radio -brachialis  ROM:    AROM/PROM       Joint: Eval Date: 12/11/2020  Re-Assess Date:    ACTIVE ROM (standing) RIGHT LEFT RIGHT LEFT   Shoulder all planes  WNL WFL       Elbow Flexion  \"tight\" feeling but WNL WNL       Elbow ext  CS at end range         Forearm sup/pron Cs pronation end range  Josiephine Dikes                        Strength:    Joint: Eval Date: 12/11/2020  Re-Assess Date:     RIGHT LEFT RIGHT LEFT   Shoulder Flexion 4+/5 4+/5       Shoulder Abduction  (C5) 4+/5 4+/5       Shoulder Internal Rotation 4+/5 4+/5       Shoulder External Rotation 4+/5 4+/5       Elbow Flexion  (C6) 4+/5 4+/5       Elbow Extension (C7) 4/5 4/5       Wrist Flexion (C7) 4/5 4+/5       Wrist Extension (C6) Cs 4/5 4+/5       Resisted Thumb Extension/Finger Abduction (C8/T1) 4+/5 4+/5        Strength Cs 40 lbs 60 lbs       Supination  cs                             CS (comparable sign): reproduced symptoms     Functional Mobility:    Avoiding most tasks with right UE          Body Structures Involved:  1. Bones  2. Joints  3. Muscles  4. Ligaments Body Functions Affected:  1. Sensory/Pain  2. Neuromusculoskeletal  3. Movement Related Activities and Participation Affected:  1. Mobility  2. Self Care   Number of elements that affect the Plan of Care: 1-2: LOW COMPLEXITY   CLINICAL PRESENTATION:   Presentation: Stable and uncomplicated: LOW COMPLEXITY   CLINICAL DECISION MAKING:      Use of outcome tool(s) and clinical judgement create a POC that gives a: Clear prediction of patient's progress: LOW COMPLEXITY     See associated treatment note for treatment provided today.     Future Appointments   Date Time Provider Liana Jana   12/17/2020  9:00 AM Martha Hong, PT [facility] [place] [facility]   12/24/2020  8:15 AM Ban Man, RT [facility]   12/31/2020  9:00 AM Martha [facility]   1/7/2021  9:00 AM Denia Pacheco, PT [facility]         Sharda Shields, PT

## 2020-12-17 ENCOUNTER — HOSPITAL ENCOUNTER (OUTPATIENT)
Dept: PHYSICAL THERAPY | Age: 55
Discharge: HOME | End: 2020-12-17
Payer: COMMERCIAL

## 2020-12-17 PROCEDURE — 97140 MANUAL THERAPY 1/> REGIONS: CPT

## 2020-12-17 PROCEDURE — 97110 THERAPEUTIC EXERCISES: CPT

## 2020-12-17 NOTE — PROGRESS NOTES
Mayte Zavala  : 1965  Payor: Fleet Caper / Plan: [place] [facility] / Product Type: O /  [address] [facility] at [address] [place] [address]. [facility], [address] [place] [address], [facility], [address] [place] [address]  [phone]   [phone]                                                          Quin Conroy MD      OUTPATIENT PHYSICAL THERAPY: Daily Treatment Note 2020   Visit Count:  2    Tx Diagnosis:  Pain in right elbow (M25.521)Stiffness of right elbow, not elsewhere classified (M25.621)Other specified disorders of tendon, right elbow (L88.879)    Pre-treatment Symptoms/Complaints: pt states exercises have been painful but ROM and pain level has decreased overall since beginning them. Still avoiding many normal activities with right due to pain such as unplugging things and reaching for canned goods in pantry. States overall much better and also less pain at night compared to last week. Pain: Initial:0/10 at rest, 3/10 movement  Post Session: 0/10   Medications Last Reviewed:  2020     Updated Objective Findings: See Initial Eval for more details. TREATMENT:   THERAPEUTIC EXERCISE: (30 minutes):  Exercises per grid below to improve mobility, strength and balance. Required minimal visual, verbal and manual cues to promote proper body alignment and promote proper body posture. Progressed resistance and complexity of movement as indicated.      Date:  2020 Date:  20  Date:         Activity/Exercise Parameters Parameters Parameters       Education HEP, POC, PT goals, anatomy/pathology Pain control, indications of exercise and activity         UBE   3/3 seated, level 1.5         Bicep stretch 3 x 20 sec  HEP        Wrist flex stretch  3 x 20 sec  HEP        Wrist ext stretch  3 x 20 sec  HEP        rows nv ----        Bicep curls  nv AROM x 5     NV-HEP with weight        Wrist pron/sup nv X 10 0#  X 20 1 #        Radial/ulnar dev  1# 2 x 10 Wrist flex/ext  X 10 0#  X 20 1#        Overhead press up  5#  x 10 seated                                   THERAPEUTIC ACTIVITY: ( 0 minutes): Activities per gid below to improve functional movement related mobility, strength and balance to improve neuro-muscular carryover to daily functional activities for improving patient's quality of life. Required visual, verbal and manual cues to promote proper body alignment and promote proper body posture/mechanics. Progressed resistance and complexity of movement as indicated. Date:  12/17/2020 Date:   Date:     Activity/Exercise Parameters Parameters Parameters   Stretching                                                                            MANUAL THERAPY: (15 minutes): Joint mobilization, Soft tissue mobilization was utilized and necessary because of the patient's restricted joint motion and restricted motion of soft tissue mobility. Date  12/17/2020    Technique Used Grade  Level # Time(s) Effect while being performed   DTM/STM AISTM with metal tool R prox wrist extensor tendon  X 10 min  Decrease triggerpoints/tension/pain  in muscle/tendon    Joint mobs AP/PA's grade IV R prox Radial head 3 bouts each  Decreased joint stiffness   PROM III  R forearm sup/rpon 3 bouts Decreased joint stiffness and muscle tightness                                            HEP Log comments 1. Bicep stretch    2. Wrist ext stretch    3.wrist flex stretch     4. ARROM wrist  With 1# weight   5. Overhead press 5#      Modalities: to decrease inflammation post treatment    Ice to right elbow in sitting x 8 min post manual       [facility] Portal  Treatment/Session Summary:    Response to Treatment: Pt arrived with improved AROM and activity tolerance today. Responded very well to above treatment. Tendon less painful and decreased tirggerpoints with palpation, as well as weighted activities no longer painful after manual treatment.  Added strengthening exercises to HEP today. No c/o's at end of session    Communication/Consultation:  Educated on HEP adding resist exercises. Encouraged to continue stretching and ice   Equipment provided today: HEP Handout     Recommendations/Intent for next treatment session:   Next visit will focus on Manual Therapy soft tissue mobilization strengtheing HEP. Treatment Plan of Care Effective Dates: 12/11/2020 TO 2/9/2021 (60 days).   Frequency/Duration: 2 times a week for 60 Days       Total Treatment Billable Duration:   45 min   PT Patient Time In/Time Out  Time In: 0900  Time Out: [facility], PT    Future Appointments   Date Time Provider Liana Bates   12/24/2020  8:15 AM Ozzie Man, RT [facility]   12/31/2020  9:00 AM Leonard Gallardo, PT [facility]   1/7/2021  9:00 AM Jose Andre, PT [facility]

## 2020-12-24 ENCOUNTER — HOSPITAL ENCOUNTER (OUTPATIENT)
Dept: PHYSICAL THERAPY | Age: 55
Discharge: HOME | End: 2020-12-24
Payer: COMMERCIAL

## 2020-12-24 PROCEDURE — 97140 MANUAL THERAPY 1/> REGIONS: CPT

## 2020-12-24 PROCEDURE — 97110 THERAPEUTIC EXERCISES: CPT

## 2020-12-24 NOTE — PROGRESS NOTES
Shiraz Petty  : 1965  Payor: Kamron Ramos / Plan: [facility] / Product Type: PPO /  [address] at [address] [place] [address]. [address] [place] [address] [address] [place] [address], [facility], [address] [place] [address]  [phone]   [phone]                                                          Magdiel Fisher MD      OUTPATIENT PHYSICAL THERAPY: Daily Treatment Note 2020   Visit Count:  3    Tx Diagnosis:  Pain in right elbow (M25.521)Stiffness of right elbow, not elsewhere classified (M25.621)Other specified disorders of tendon, right elbow (L47.760)    Pre-treatment Symptoms/Complaints: pt states exercises continue to be painful and she was really sore after last therapy session. Has not progressed weight. Pain: Initial:0/10 at rest, 4/10 movement  Post Session: 4/10   Medications Last Reviewed:  2020     Updated Objective Findings:  Right  elbow at 90: 40 lbs, L 72 lbs  Elbow straight Right 20 lbs posititive for pain lateral epicondyle. L 70 lbs elbow extended. TREATMENT:   THERAPEUTIC EXERCISE: 25 minutes):  Exercises per grid below to improve mobility, strength and balance. Required minimal visual, verbal and manual cues to promote proper body alignment and promote proper body posture. Progressed resistance and complexity of movement as indicated. Date:  2020 Date:  20  Date:  20         Activity/Exercise Parameters Parameters Parameters       Education HEP, POC, PT goals, anatomy/pathology Pain control, indications of exercise and activity  Safe pain parameters, traffic system. Progressive loading for tendonitis.        UBE   3/3 seated, level 1.5         Bicep stretch 3 x 20 sec  HEP HEP       Wrist flex stretch  3 x 20 sec  HEP HEP       Wrist ext stretch  3 x 20 sec  HEP HEP       rows nv ----        Bicep curls  nv AROM x 5     NV-HEP with weight        Wrist pron/sup nv X 10 0#  X 20 1 #        Radial/ulnar dev  1# 2 x 10 Wrist flex/ext  X 10 0#  X 20 1#        Overhead press up  5#  x 10 seated         Bilateral ER with band isometric hold    45 sec hold, 30 sec relax 5 x heavy red band       Eccentric wrist extension   3# 3 x 10              THERAPEUTIC ACTIVITY: ( 0 minutes): Activities per gid below to improve functional movement related mobility, strength and balance to improve neuro-muscular carryover to daily functional activities for improving patient's quality of life. Required visual, verbal and manual cues to promote proper body alignment and promote proper body posture/mechanics. Progressed resistance and complexity of movement as indicated. Date:  12/17/2020 Date:   Date:     Activity/Exercise Parameters Parameters Parameters   Stretching                                                                            MANUAL THERAPY: (15 minutes): Joint mobilization, Soft tissue mobilization was utilized and necessary because of the patient's restricted joint motion and restricted motion of soft tissue mobility. Date  12/17/2020    Technique Used Grade  Level # Time(s) Effect while being performed   DTM/STM AISTM with metal tool R prox wrist extensor tendon  X 10 min  Decrease triggerpoints/tension/pain  in muscle/tendon    Joint mobs AP/PA's grade IV R prox Radial head 3 bouts each  Decreased joint stiffness   PROM III  R forearm sup/rpon 3 bouts Decreased joint stiffness and muscle tightness                                            HEP Log comments 1. Bicep stretch    2. Wrist ext stretch    3.wrist flex stretch     4. ARROM wrist  With 1# weight   5. Overhead press 5#    6. Isometric ER and wrist extension see paper chart (12/24/20) 45/30 5 x   7.  Eccentric wrist extension (12/24/20) see paper chart 3 x 10     Modalities: to decrease inflammation post treatment    Ice to right elbow in sitting x 8 min post manual       [facility] Portal  Treatment/Session Summary:    Response to Treatment: Pain levels did not change much from baseline. Communication/Consultation:  Educated on safe pain parameters, given copy of traffic light system. Copy in chart. Stressed tendon must be loaded to heal.   Equipment provided today: HEP Handout     Recommendations/Intent for next treatment session:   Next visit will focus on Manual Therapy soft tissue mobilization strengtheing HEP. Treatment Plan of Care Effective Dates: 12/11/2020 TO 2/9/2021 (60 days).   Frequency/Duration: 2 times a week for 60 Days       Total Treatment Billable Duration:   40 min   PT Patient Time In/Time Out  Time In: 6139  Time Out: [address], RT    Future Appointments   Date Time Provider Liana Bates   12/31/2020  9:00 AM Barbie Kerr, PT [facility]   1/7/2021  9:00 AM Radha Lee, PT [facility]

## 2020-12-31 ENCOUNTER — HOSPITAL ENCOUNTER (OUTPATIENT)
Dept: PHYSICAL THERAPY | Age: 55
Discharge: HOME | End: 2020-12-31
Payer: COMMERCIAL

## 2020-12-31 PROCEDURE — 97110 THERAPEUTIC EXERCISES: CPT

## 2020-12-31 PROCEDURE — 97140 MANUAL THERAPY 1/> REGIONS: CPT

## 2020-12-31 NOTE — PROGRESS NOTES
Riya Mai  : 1965  Payor: [employer] / Plan: [facility] / Product Type: O /  [facility] at [address] [place] [address]. Paddy Hinton., [address] [place] [address], [facility], [address] [place] [address]  [phone]   [phone]                                                          Louise Vidal MD      OUTPATIENT PHYSICAL THERAPY: Daily Treatment Note 2020   Visit Count:  4    Tx Diagnosis:  Pain in right elbow (M25.521)Stiffness of right elbow, not elsewhere classified (M25.621)Other specified disorders of tendon, right elbow (K61.225)    Pre-treatment Symptoms/Complaints:  Pain is increasing per patient and now constant and increases with more movements. Pt states day after last session pain increase. Pt states that the HEP got easier but pain persisted. Pain: Initial:4/10 at rest, 5/10 movement  Post Session: 0   Medications Last Reviewed:  2020     Updated Objective Findings:  Right  elbow at 90: 40 lbs, L 72 lbs  Elbow straight Right 20 lbs posititive for pain lateral epicondyle. L 70 lbs elbow extended. TREATMENT:   THERAPEUTIC EXERCISE: 15 minutes):  Exercises per grid below to improve mobility, strength and balance. Required minimal visual, verbal and manual cues to promote proper body alignment and promote proper body posture. Progressed resistance and complexity of movement as indicated. Date:  2020 Date:  20  Date:  20      Activity/Exercise Parameters Parameters Parameters       Education HEP, POC, PT goals, anatomy/pathology Pain control, indications of exercise and activity  Safe pain parameters, traffic system. Progressive loading for tendonitis.        UBE   3/3 seated, level 1.5   3/3 seated level       Bicep stretch 3 x 20 sec  HEP HEP 5 x 10 sec      Wrist flex stretch  3 x 20 sec  HEP HEP 2 x 20 sec       Wrist ext stretch  3 x 20 sec  HEP HEP 2 x 20 sec      rows nv ----        Bicep curls  nv AROM x 5     NV-HEP with weight        Wrist pron/sup nv X 10 0#  X 20 1 #        Radial/ulnar dev  1# 2 x 10         Wrist flex/ext  X 10 0#  X 20 1#        Overhead press up  5#  x 10 seated         Bilateral ER with band isometric hold    45 sec hold, 30 sec relax 5 x heavy red band Changed to 30 sec and 3 reps (supinated       Eccentric wrist extension   3# 3 x 10  HEP            THERAPEUTIC ACTIVITY: ( 0 minutes): Activities per gid below to improve functional movement related mobility, strength and balance to improve neuro-muscular carryover to daily functional activities for improving patient's quality of life. Required visual, verbal and manual cues to promote proper body alignment and promote proper body posture/mechanics. Progressed resistance and complexity of movement as indicated. Date:  12/17/2020 Date:   Date:     Activity/Exercise Parameters Parameters Parameters   Stretching                                                                            MANUAL THERAPY: (40 minutes): Joint mobilization, Soft tissue mobilization was utilized and necessary because of the patient's restricted joint motion and restricted motion of soft tissue mobility. Date  12/31/2020    Technique Used Grade  Level # Time(s) Effect while being performed   DTM/STM AISTM with metal tool R prox wrist extensor tendon  X 15 min  Decrease triggerpoints/tension/pain  in muscle/tendon    Joint mobs AP/PA's grade IV R prox Radial head 3 bouts each  Decreased joint stiffness   PROM III  R forearm sup/pron 3 bouts Decreased joint stiffness and muscle tightness                                            HEP Log comments 1. Bicep stretch    2. Wrist ext stretch    3.wrist flex stretch     4. ARROM wrist  With 1# weight   5. Overhead press 5#    6. Isometric ER and wrist extension see paper chart (12/24/20) 30/30 3 x   7.  Eccentric wrist extension (12/24/20) see paper chart 3 x 10     Modalities: to decrease inflammation post treatment    Ice to right elbow in sitting x 8 min post manual       [employer] Portal  Treatment/Session Summary:    Response to Treatment: Pt with flare up after last session --reduced hold time and reps of new iso hold. reinforced the \"stop\" indicators for exercises. Focused on manual and ice to decrease inflammation. Pt with decreased pain at end of session. Communication/Consultation:  Educated on safe pain parameters, given copy of traffic light system. Copy in chart. Stressed tendon must be loaded to heal.   Equipment provided today: HEP Handout     Recommendations/Intent for next treatment session:   Next visit will focus on Manual Therapy soft tissue mobilization strengtheing HEP. Treatment Plan of Care Effective Dates: 12/11/2020 TO 2/9/2021 (60 days).   Frequency/Duration: 2 times a week for 60 Days       Total Treatment Billable Duration:  55 min   PT Patient Time In/Time Out  Time In: 0900  Time Out: [facility], PT    Future Appointments   Date Time Provider Liana Bates   1/7/2021  9:00 AM Aga Prescott, PT [facility] AND [facility]

## 2021-01-07 ENCOUNTER — HOSPITAL ENCOUNTER (OUTPATIENT)
Dept: PHYSICAL THERAPY | Age: 56
Discharge: HOME | End: 2021-01-07
Payer: COMMERCIAL

## 2021-01-07 PROCEDURE — 97110 THERAPEUTIC EXERCISES: CPT

## 2021-01-07 PROCEDURE — 97140 MANUAL THERAPY 1/> REGIONS: CPT

## 2021-01-07 NOTE — PROGRESS NOTES
Magali Elizabeth  : 1965  Payor: Ravindra Rogers / Plan: [facility] / Product Type: PPO /  [address] at [address]. Sheila Sharma, [address] [place] [address], [facility], [address] [place] [address] Road  [phone]   [phone]                                                          Sergio Juárez MD      OUTPATIENT PHYSICAL THERAPY: Daily Treatment Note 2021   Visit Count:  5    Tx Diagnosis:  Pain in right elbow (M25.521)Stiffness of right elbow, not elsewhere classified (M25.621)Other specified disorders of tendon, right elbow (U95.864)    Pre-treatment Symptoms/Complaints:  Pt states that she has done much better regarding her pain since beginning new regimen for HEP. Progressed to 5# weight at home   Pain: Initial: 0/10 at rest  Post Session: 0   Medications Last Reviewed:  2021     Updated Objective Findings:  Right  elbow at 90: 54 lbs, L 68lbs  Elbow straight Right 20 lbs posititive for pain lateral epicondyle. L 70 lbs elbow extended. TREATMENT:   THERAPEUTIC EXERCISE: (30minutes):  Exercises per grid below to improve mobility, strength and balance. Required minimal visual, verbal and manual cues to promote proper body alignment and promote proper body posture. Progressed resistance and complexity of movement as indicated. Date:  2020 Date:  20  Date:  20     Activity/Exercise Parameters Parameters Parameters       Education HEP, POC, PT goals, anatomy/pathology Pain control, indications of exercise and activity  Safe pain parameters, traffic system. Progressive loading for tendonitis.        UBE   3/3 seated, level 1.5   3/3 seated level  3/3 seated      Bicep stretch 3 x 20 sec  HEP HEP 5 x 10 sec      Wrist flex stretch  3 x 20 sec  HEP HEP 2 x 20 sec       Wrist ext stretch  3 x 20 sec  HEP HEP 2 x 20 sec      rows nv ----   Cables 7# 3 x 8 reps     Bicep curls  nv AROM x 5     NV-HEP with weight   5# 3 x 8 reps     Wrist pron/sup nv X 10 0#  X 20 1 #        Radial/ulnar dev  1# 2 x 10         Wrist flex/ext  X 10 0#  X 20 1#        Overhead press up  5#  x 10 seated    5# 2 x 8      Bilateral ER with band isometric hold    45 sec hold, 30 sec relax 5 x heavy red band Changed to 30 sec and 3 reps (supinated       Eccentric wrist extension   3# 3 x 10  HEP      \"Wringing\"      X 10                                THERAPEUTIC ACTIVITY: ( 0 minutes): Activities per gid below to improve functional movement related mobility, strength and balance to improve neuro-muscular carryover to daily functional activities for improving patient's quality of life. Required visual, verbal and manual cues to promote proper body alignment and promote proper body posture/mechanics. Progressed resistance and complexity of movement as indicated. Date:  12/17/2020 Date:   Date:     Activity/Exercise Parameters Parameters Parameters   Stretching                                                                            MANUAL THERAPY: (20 minutes): Joint mobilization, Soft tissue mobilization was utilized and necessary because of the patient's restricted joint motion and restricted motion of soft tissue mobility. Date  12/31/2020    Technique Used Grade  Level # Time(s) Effect while being performed   DTM/STM AISTM with metal tool R prox wrist and pollicis  extensor tendon  X 10 min  Decrease triggerpoints/tension/pain  in muscle/tendon    Joint mobs AP/PA's grade IV R prox Radial head 0 today  Decreased joint stiffness   PROM III  R forearm sup/pron 3 bouts Decreased joint stiffness and muscle tightness                                            HEP Log comments 1. Bicep stretch    2. Wrist ext stretch    3.wrist flex stretch     4. ARROM wrist  With 1# weight   5. Overhead press 5#    6. Isometric ER and wrist extension see paper chart (12/24/20) 30/30 3 x   7.  Eccentric wrist extension (12/24/20) see paper chart 3 x 10     Modalities: to decrease inflammation post treatment          [employer] Portal  Treatment/Session Summary:    Response to Treatment: Decreased pain with all exercises today. Added scap and bicep work today. Minimal manual work today performed due to decreased symptoms.   Some signs suggest extensor pollicis longus involvement however not consistent.    Communication/Consultation:  .   Equipment provided today: HEP Handout     Recommendations/Intent for next treatment session:   Next visit will focus on Manual Therapy soft tissue mobilization strengtheing HEP. To see pt in one week for follow up on new exercises.  Discuss discharge if appropriate         Treatment Plan of Care Effective Dates: 12/11/2020 TO 2/9/2021 (60 days).  Frequency/Duration: 2 times a week for 60 Days       Total Treatment Billable Duration:  45 min   PT Patient Time In/Time Out  Time In: 0900  Time Out: 1000  Anahi Prescott PT    Future Appointments   Date Time Provider Department Center   1/14/2021  9:00 AM Anahi Prescott PT [facility]   1/22/2021 11:15 AM Anahi Prescott [facility]

## 2021-01-14 ENCOUNTER — HOSPITAL ENCOUNTER (OUTPATIENT)
Dept: PHYSICAL THERAPY | Age: 56
Discharge: HOME | End: 2021-01-14
Payer: COMMERCIAL

## 2021-01-14 PROCEDURE — 97110 THERAPEUTIC EXERCISES: CPT

## 2021-01-14 NOTE — PROGRESS NOTES
Robert Adams  : 1965  Payor: Michelle Rudd / Plan: [facility] / Product Type: PPO /  [address] [facility] at [address] [place] [address]. [address] [place] [address] [address] [place] [address], [facility], [address] [place] [address]  [phone]   [phone]                                                          Collette Colt, MD      OUTPATIENT PHYSICAL THERAPY: Daily Treatment Note 2021   Visit Count:  6    Tx Diagnosis:  Pain in right elbow (M25.521)Stiffness of right elbow, not elsewhere classified (M25.621)Other specified disorders of tendon, right elbow (S59.279)    Pre-treatment Symptoms/Complaints:  Pt states that feels that she can do everything but still has some pain with unplugging things. She has increased her weights to 5#. Pain: Initial: 0/10 at rest  Post Session: 0   Medications Last Reviewed:  2021     Updated Objective Findings:  Right  elbow at 90: 54 lbs, L 68lbs  Elbow straight Right 20 lbs posititive for pain lateral epicondyle. L 70 lbs elbow extended. TREATMENT:   THERAPEUTIC EXERCISE: (50 minutes):  Exercises per grid below to improve mobility, strength and balance. Required minimal visual, verbal and manual cues to promote proper body alignment and promote proper body posture. Progressed resistance and complexity of movement as indicated. Date:  2020 Date:  20  Date:  20    Activity/Exercise Parameters Parameters Parameters       Education HEP, POC, PT goals, anatomy/pathology Pain control, indications of exercise and activity  Safe pain parameters, traffic system. Progressive loading for tendonitis.        UBE   3/3 seated, level 1.5   3/3 seated level  3/3 seated  3/3 seated level 4    Bicep stretch 3 x 20 sec  HEP HEP 5 x 10 sec      Wrist flex stretch  3 x 20 sec  HEP HEP 2 x 20 sec       Wrist ext stretch  3 x 20 sec  HEP HEP 2 x 20 sec      rows nv ----   Cables 7# 3 x 8 reps Cables 10 # 2 x 10 Bicep curls  nv AROM x 5     NV-HEP with weight   5# 3 x 8 reps Sup/nutral/pron x 10 each , 5#    Wrist pron/sup nv X 10 0#  X 20 1 #        Radial/ulnar dev  1# 2 x 10     With key pinch x 1 min rep    Wrist flex/ext  X 10 0#  X 20 1#        Overhead press up  5#  x 10 seated    5# 2 x 8      Bilateral ER with band isometric hold    45 sec hold, 30 sec relax 5 x heavy red band Changed to 30 sec and 3 reps (supinated       Eccentric wrist extension   3# 3 x 10  HEP      \"Wringing\"      X 10  With putty x 5    t-band wall walk       **    Putty finger thumb ext      Red x 5 putty    X 5, 10 sec hold with rubber band           THERAPEUTIC ACTIVITY: ( 0 minutes): Activities per gid below to improve functional movement related mobility, strength and balance to improve neuro-muscular carryover to daily functional activities for improving patient's quality of life. Required visual, verbal and manual cues to promote proper body alignment and promote proper body posture/mechanics. Progressed resistance and complexity of movement as indicated. Date:  12/17/2020 Date:   Date:     Activity/Exercise Parameters Parameters Parameters   Stretching                                                                            MANUAL THERAPY: ( minutes): Joint mobilization, Soft tissue mobilization was utilized and necessary because of the patient's restricted joint motion and restricted motion of soft tissue mobility. Date  12/31/2020    Technique Used Grade  Level # Time(s) Effect while being performed   DTM/STM AISTM with metal tool R prox wrist and pollicis  extensor tendon  X 10 min  Decrease triggerpoints/tension/pain  in muscle/tendon    Joint mobs AP/PA's grade IV R prox Radial head 0 today  Decreased joint stiffness   PROM III  R forearm sup/pron 3 bouts Decreased joint stiffness and muscle tightness                                            HEP Log comments 1. Bicep stretch    2. Wrist ext stretch    3.wrist flex stretch     4. ARROM wrist  With 1# weight   5. Overhead press 5#    6. Isometric ER and wrist extension see paper chart (12/24/20) 30/30 3 x   7. Eccentric wrist extension (12/24/20) see paper chart 3 x 10     Modalities: to decrease inflammation post treatment          [employer] Portal  Treatment/Session Summary:    Response to Treatment:   Weight of radial dev on HEP reduce to 3# due to pain toward end of set. Added putty digit ext, key pinch and band thumb ext for HEP. All performed without pain but were challenging. Pt will f/u in 1 week to determine progress and timeline of POC     Communication/Consultation:  .   Equipment provided today: HEP Handout     Recommendations/Intent for next treatment session:   Next visit will focus on Manual Therapy soft tissue mobilization strengtheing HEP. F/u and determine discharge if necessary         Treatment Plan of Care Effective Dates: 12/11/2020 TO 2/9/2021 (60 days).   Frequency/Duration: 2 times a week for 60 Days       Total Treatment Billable Duration:  50 min  PT Patient Time In/Time Out  Time In: 0900  Time Out: [facility], PT    Future Appointments   Date Time Provider Liana Bates   1/22/2021 11:15 AM Preet Davis, PT [facility] AND [facility]

## 2021-06-30 ENCOUNTER — TRANSCRIBE ORDER (OUTPATIENT)
Dept: SCHEDULING | Age: 56
End: 2021-06-30

## 2021-06-30 DIAGNOSIS — M81.0 OSTEOPOROSIS: ICD-10-CM

## 2021-06-30 DIAGNOSIS — Z12.31 ENCOUNTER FOR SCREENING MAMMOGRAM FOR MALIGNANT NEOPLASM OF BREAST: Primary | ICD-10-CM

## 2021-08-03 ENCOUNTER — HOSPITAL ENCOUNTER (OUTPATIENT)
Dept: MAMMOGRAPHY | Age: 56
Discharge: HOME | End: 2021-08-03
Attending: FAMILY MEDICINE
Payer: COMMERCIAL

## 2021-08-03 DIAGNOSIS — Z12.31 ENCOUNTER FOR SCREENING MAMMOGRAM FOR MALIGNANT NEOPLASM OF BREAST: ICD-10-CM

## 2021-08-03 DIAGNOSIS — M81.0 OSTEOPOROSIS: ICD-10-CM

## 2021-08-03 PROCEDURE — 77080 DXA BONE DENSITY AXIAL: CPT

## 2021-08-03 PROCEDURE — 77067 SCR MAMMO BI INCL CAD: CPT

## 2022-01-10 ENCOUNTER — HOSPITAL ENCOUNTER (OUTPATIENT)
Dept: PHYSICAL THERAPY | Age: 57
Discharge: HOME | End: 2022-01-10
Payer: COMMERCIAL

## 2022-01-10 PROCEDURE — 97140 MANUAL THERAPY 1/> REGIONS: CPT

## 2022-01-10 PROCEDURE — 97161 PT EVAL LOW COMPLEX 20 MIN: CPT

## 2022-01-10 NOTE — PROGRESS NOTES
Noni Granger  : 1965  Payor: Marci Meza / Plan: [place] [facility] beds at the [facility] / Product Type: PPO /  [address] at [address]. [facility], [address] [place] [address], [address] [place] [address]  [phone]   [phone]                                                          Referred, Self, MD      OUTPATIENT PHYSICAL THERAPY: Daily Treatment Note 1/10/2022 Visit Count:  1    Tx Diagnosis:        Pre-treatment Symptoms/Complaints: See Initial Eval Dated 1/10/2022 for more details. Pain: Initial:3/10  Medications Last Reviewed:  1/10/2022     Post Session: 3/10   Updated Objective Findings: See Initial Eval for more details. TREATMENT:   THERAPEUTIC EXERCISE: (0 minutes):  Exercises per grid below to improve mobility, strength and balance. Required minimal visual, verbal and manual cues to promote proper body alignment and promote proper body posture. Progressed resistance and complexity of movement as indicated. [employer] Portal Date:  1/10/2022 Date:   Date:     Activity/Exercise Parameters Parameters Parameters   Education HEP, POC, PT goals, anatomy/pathology                                               THERAPEUTIC ACTIVITY: ( 0 minutes): Activities per gid below to improve functional movement related mobility, strength and balance to improve neuro-muscular carryover to daily functional activities for improving patient's quality of life. Required visual, verbal and manual cues to promote proper body alignment and promote proper body posture/mechanics. Progressed resistance and complexity of movement as indicated.      Date:  1/10/2022 Date:   Date:     Activity/Exercise Parameters Parameters Parameters                                                                               MANUAL THERAPY: 25minutes): Joint mobilization, Soft tissue mobilization was utilized and necessary because of the patient's restricted joint motion and restricted motion of soft tissue mobility. Date  1/10/2022    Technique Used Grade  Level # Time(s) Desired effect/clinical reasoning   STM/DTM   Right iliopsoas  20 mi Decreased tone of hip flexor    IR/ER right hip (passive)   2 bouts  Increased mobility                                         *included in HEP      Treatment/Session Summary:    Response to Treatment: Pt demonstrated understanding of POC and initial HEP. No increase in pain or adverse reactions. Communication/Consultation:  POC, HEP, PT goals, Faxed initial evaluation to MD.   Equipment provided today: HEP Handout   Recommendations/Intent for next treatment session:   Next visit will focus on Manual Therapy Core Stability Pain Science Education Quad strengthening Hip strengthening. Treatment Plan of Care Effective Dates: 1/10/2022 TO 2/9/2022 (30 days).   Frequency/Duration: 2 times a week for 30 Days           Total Treatment Billable Duration:   25  Rx plus Eval   PT Patient Time In/Time Out  Time In: 1300  Time Out: Nancy 23, PT    Future Appointments   Date Time Provider Liana Bates   1/13/2022  9:30 AM Monse Martinez, PT [facility] AND [place] [facility]   1/17/2022  1:45 PM Monse Martinez, PT [facility]   1/20/2022  9:45 AM Monse Martinez, PT [facility]   1/24/2022  1:45 PM Monse Martinez, PT [facility]   1/27/2022 11:15 AM Monse Martinez, PT [facility]   1/31/2022  9:00 AM Monse Martinez, PT [facility]   2/3/2022  1:45 PM Avtar Prescott, PT [facility]

## 2022-01-10 NOTE — THERAPY EVALUATION
Bowen Phipps  : 1965  Primary: [facility]*  Secondary:  Therapy Center at [address] [place] [address], [address]  [phone]   [phone]         OUTPATIENT PHYSICAL THERAPY:Initial Assessment 1/10/2022    ICD-10: Treatment Diagnosis:  Pain in right hip (M25.551)  Psoas tendinitis, right hip (M76.11)    Precautions/Allergies:   Codeine and Percocet [oxycodone-acetaminophen]   MD Orders: Eval and Treat  MEDICAL/REFERRING DIAGNOSIS:  Strain of right groin [I91.062W]   DATE OF ONSET: 3 weeks ago  REFERRING PHYSICIAN: Referred, Self, MD  RETURN PHYSICIAN APPOINTMENT: TBA     INITIAL ASSESSMENT:  Ms. Juan Pablo Adams presents to physical therapy with functional limitations due left hip pain. These S/S are consistent with iliopsoas strain . Patient will benefit from skilled physical therapy for manual therapeutic techniques (as appropriate), therapeutic exercises and activities, balance and comprehensive home exercises program to address current impairments and functional limitations. Bwoen Phipps will benefit from skilled PT (medically necessary) in order to address above deficits affecting participation in basic ADLs and overall functional tolerance. PROBLEM LIST (Impacting functional limitations):   1. Decreased Strength  2. Decreased ADL/Functional Activities  3. Decreased Transfer Abilities  4. Decreased Ambulation Ability/Technique  5. Decreased Balance  6. Increased Pain  7. Decreased Joint mobility/Flexibility   8. Decreased Activity Tolerance  9. Decreased Swisher with Home Exercise Program INTERVENTIONS PLANNED:   1. Balance Exercise  2. Cryotherapy  3. Gait Training  4. Heat  5. Home Exercise Program (HEP)  6. Manual Therapy  7. Neuromuscular Re-education/Strengthening  8. Range of Motion (ROM)  9. Therapeutic Activites  10. Therapeutic Exercise/Strengthening  11. Transfer Training  12.  Ultrasound (US) TREATMENT PLAN:  Effective Dates: 1/10/2022 TO 2/9/2022 (30 days). Frequency/Duration: 2 times a week for 30 Days    GOALS: (Goals have been discussed and agreed upon with patient.)   Short-Term Goals~4 weeks  Goal Met   1. Noni Granger will be independent with HEP for strength and ROM 1.  [] Date:   2. Noni Granger will tolerate manual therapy/joint mobilizations to increase knee flexion ROM so pt can ambulate stairs and walk with a more normalized gait pattern. 2.  [] Date:   3. Noni Granger will participate in core and LE strengthening exercises. 3.  [] Date:   5. Noni Granger will participate in static and dynamic balance activities for 5 minutes to help improve proprioception and decrease risk of falls 5. [] Date:   6. Noni Granger to increase lower extremity functional scale by 10 points to show improvement in areas of difficulty 6. [] Date:   Jose Pierce will improve MMT B hips LE to >=4+/5 to improve current level of independence and community reintegration. 9.  [] Date:         Long Term Goals~8 weeks Goal Met   1. Noni Granger will be independent in HEP of stretching and strengthening. 1.  [] Date:   3. Noni Granger will ascend/descend 12 steps with reciprocal gait pattern and rail 3. [] Date:   4. Noni Granger to increase lower extremity functional scale by 20 points to show improvement in areas of difficulty  4. [] Date:    Noni Granger will be able to stand/walk 10 min or greater without symptom onset. Noni Granger will report no symptoms radicular symptoms of right LE. Outcome Measure: Tool Used: Lower Extremity Functional Scale (LEFS)  Score:  Initial: 42/80 Most Recent: X/80 (Date: -- )   Interpretation of Score: 20 questions each scored on a 5 point scale with 0 representing \"extreme difficulty or unable to perform\" and 4 representing \"no difficulty\".   The lower the score, the greater the functional disability. 80/80 represents no disability. Minimal detectable change is 9 points. Medical Necessity:   · Skilled intervention continues to be required due to current impairment. Reason for Services/Other Comments:  · Patient continues to require skilled intervention due to patient continues to present with impairments assessed at initial evaluation and requiring skilled physical therapy to meet goals for PT. Total Treatment Duration:  PT Patient Time In/Time Out  Time In: 1300  Time Out: 1400    Rehabilitation Potential For Stated Goals: Good  Regarding Timothy Dugan's therapy, I certify that the treatment plan above will be carried out by a therapist or under their direction. Thank you for this referral,  Sean Figueroa PT     Referring Physician Signature: Referred, Self, MD              Date                  HISTORY:   History of Present Injury/Illness (Reason for Referral):  2 years ago pulled right groin took approx 1 year to resolve. Recently, pt started daily walking routine for fitness involving on treadmill every day. 2-3 weeks ago  groin pain began and progressed to right lateral leg numbness. Backed off of exercise routine but did not help much to reduce symptoms.     -Present Symptoms (on day of evaluation): constant ache and tender anterior hip, right lateral lower leg decreased sensation intermittent        Pain Severity:  · Currently: 3/10  · Best: 3/10  · Worst: 8/10    · Aggravating factors: lifting heavy objects , > 5 min walking, incline walking, stair climbing    · Relieving factors: ice application, rest   · Irritability: Medium (Onset of pain is equal to alleviation)    Past Medical History/Comorbidities:   Ms. Mariella Guzman  has a past medical history of Depression, HTN (hypertension)   Low Risk (1/8/2016), Low serum vitamin D, Migraines, and Mixed hyperlipidemia (1/8/2016).   Ms. Mariella Guzman  has a past surgical history that includes hx orthopaedic; hx gyn; hx colonoscopy; and hx hysterectomy. 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   Prior Level of Function/Work/Activity:  Retired   Previous Treatment Approach  none  Other Clinical Tests:  none  Current Medications:    Current Outpatient Medications:     buPROPion XL (WELLBUTRIN XL) 150 mg tablet, , Disp: , Rfl:     mupirocin (BACTROBAN) 2 % ointment, , Disp: , Rfl:     suvorexant (BELSOMRA) 10 mg tablet, Take  by mouth nightly as needed for Insomnia., Disp: , Rfl:     ibuprofen (MOTRIN) 600 mg tablet, Take 1 Tab by mouth two (2) times a day., Disp: 30 Tab, Rfl: 0    tavaborole (KERYDIN) 5 % moise, by Apply Externally route., Disp: , Rfl:     ezetimibe-simvastatin (VYTORIN) 10-40 mg per tablet, Take 1 Tab by mouth nightly., Disp: 90 Tab, Rfl: 3    estradiol (ESTRACE) 1 mg tablet, Take 1 Tab by mouth daily. , Disp: 90 Tab, Rfl: 3    augmented betamethasone dipropionate (DIPROLENE-AF) 0.05 % ointment, Apply  to affected area two (2) times a day., Disp: 15 g, Rfl: 1    EPINEPHrine (EPIPEN) 0.3 mg/0.3 mL injection, 0.3 mL by IntraMUSCular route once as needed for up to 1 dose., Disp: 2 Syringe, Rfl: 0    calcium citrate-vitamin D3 (CITRACAL WITH VITAMIN D MAXIMUM) tablet, Take  by mouth two (2) times a day., Disp: , Rfl:     omega-3 fatty acids-vitamin e (FISH OIL) 1,000 mg cap, Take 1 Cap by mouth., Disp: , Rfl:     coenzyme q10 (CO Q-10) 10 mg cap, Take  by mouth., Disp: , Rfl:     aspirin 81 mg chewable tablet, Take 81 mg by mouth daily. , Disp: , Rfl:       Ambulatory/Rehab Services H2 Model Falls Risk Assessment    Risk Factors: No Risk Factors Identified Ability to Rise from Chair:       (0)  Ability to rise in a single movement    Falls Prevention Plan:       No modifications necessary   Total: (5 or greater = High Risk): 0    ©2010 [facility] Patent #[id number]. Federal Law prohibits the replication, distribution or use without written permission from [employer]         Date Last Reviewed:  1/10/2022   Number of Personal Factors/Comorbidities that affect the Plan of Care: 0: LOW COMPLEXITY   EXAMINATION:   Observation/Orthostatic Postural Assessment:   Gait:  Decreased scotty, mild decreased hip ext right   Standing alignment:   · Right PSIS elevated compared to left   · Right LE longer than left   · Right left lumbar shift present   Palpation:    Tender of iliopsoas     MMT        Joint: Eval Date: 1/10/21  Re-Assess Date:    Strength  RIGHT LEFT RIGHT LEFT   Knee Extension 4+/5 4+/5        Knee Flexion 4+/5 4+/5       Hip abduction  4/5 4/5       Hip Flexion 4/5 4/5        Ankle DF         Ankle PF                           ROM:  · LE: mild pain with right hip quadrant and passive hip extension   · Lumbar AROM WNL increased ant hip pull with extension and left rotation      Functional Mobility: Limited tolerance of walking and standing  Sit to Stand= normal mechanics, slow  Squat= NV  Single Leg Step Down= NV    Balance:   30 second STS= NV     Body Structures Involved:  1. Bones  2. Joints  3. Muscles  4. Ligaments Body Functions Affected:  1. Sensory/Pain  2. Neuromusculoskeletal  3. Movement Related Activities and Participation Affected:  1. Mobility  2.  Self Care   Number of elements that affect the Plan of Care: 1-2: LOW COMPLEXITY   CLINICAL PRESENTATION:   Presentation: Stable and uncomplicated: LOW COMPLEXITY   CLINICAL DECISION MAKING:      Use of outcome tool(s) and clinical judgement create a POC that gives a: Clear prediction of patient's progress: LOW COMPLEXITY   See treatment note for associated treatment provided today.       Future Appointments   Date Time Provider Liana Jana   1/13/2022  9:30 AM [facility]   1/17/2022  1:45 PM Gurpreet Agee, PT [facility]   1/20/2022  9:45 AM Gurpreet Agee, PT [facility]   1/24/2022  1:45 PM Gurpreet Agee, PT [facility]   1/27/2022 11:15 AM Gurpreet Agee, PT [facility]   1/31/2022  9:00 AM Gurpreet Agee, PT [facility]   2/3/2022  1:45 PM Dona Prescott, PT [facility]         Andree Duckworth, PT

## 2022-01-13 ENCOUNTER — HOSPITAL ENCOUNTER (OUTPATIENT)
Dept: PHYSICAL THERAPY | Age: 57
Discharge: HOME | End: 2022-01-13
Payer: COMMERCIAL

## 2022-01-13 PROCEDURE — 97110 THERAPEUTIC EXERCISES: CPT

## 2022-01-13 PROCEDURE — 97140 MANUAL THERAPY 1/> REGIONS: CPT

## 2022-01-13 NOTE — PROGRESS NOTES
Samuel Rawls  : 1965  Payor: [employer] / [place]: [place] [facility] / Product Type: PPO /  [address] at [address]. [facility], [address] [place] [address], [facility], [address] [place] [address]  [phone]   [phone]                                                          Referred, Self, MD      OUTPATIENT PHYSICAL THERAPY: Daily Treatment Note 2022   Visit Count:  2    Tx Diagnosis:  Pain in right hip (M25.551)  Psoas tendinitis, right hip (M76.11)        Pre-treatment Symptoms/Complaints: Pt states that the night after last session she was in bed and felt strange sensation like a vibration of low back. Pt states she was sore but doing ok today. Focusing on her avoiding crossing legs. Pain: Initial:0/10  Medications Last Reviewed:  2022     Post Session: 0/10   Updated Objective Findings: See Initial Eval for more details. TREATMENT:   THERAPEUTIC EXERCISE: (30 minutes):  Exercises per grid below to improve mobility, strength and balance. Required minimal visual, verbal and manual cues to promote proper body alignment and promote proper body posture. Progressed resistance and complexity of movement as indicated. [employer] Portal Date:  2022 Date:   Date:     Activity/Exercise Parameters Parameters Parameters   Education HEP, POC, PT goals, anatomy/pathology     Clamshells  X 10, 5 sec B     Prone hip ext with knee flexion  X 10, 5 sec B      1/2 kneel hip flexor stretch  3 x 10 B     nustep  8 min twin peaks, level 3                        THERAPEUTIC ACTIVITY: ( 0 minutes): Activities per gid below to improve functional movement related mobility, strength and balance to improve neuro-muscular carryover to daily functional activities for improving patient's quality of life. Required visual, verbal and manual cues to promote proper body alignment and promote proper body posture/mechanics.  Progressed resistance and complexity of movement as indicated. Date:  1/13/2022 Date:   Date:     Activity/Exercise Parameters Parameters Parameters                                                                               MANUAL THERAPY: 25 minutes): Joint mobilization, Soft tissue mobilization was utilized and necessary because of the patient's restricted joint motion and restricted motion of soft tissue mobility. Date  1/13/2022    Technique Used Grade  Level # Time(s) Desired effect/clinical reasoning   STM/DTM   Right iliopsoas  20 mi Decreased tone of hip flexor    IR/ER right hip (passive)  Right  2 bouts  Increased mobility    Joint mob: CECY's  IV- L4-5 3 bouts  Decreased joint restriction    Medial rotation hip   Right  3 bouts  Decrease joint restriction                           *included in HEP      Treatment/Session Summary:    Response to Treatment: Pt with less tenderness of iliopsoas today during manual..  Added strengthening and stretch for HEP to maintain gains in clinic. Pt demonstrates independence and understanding of HEP. No pain during or at the end of session today. Communication/Consultation:     Equipment provided today: HEP Handout   Recommendations/Intent for next treatment session:   Next visit will focus on Manual Therapy Core Stability Pain Science Education Quad strengthening Hip strengthening. Treatment Plan of Care Effective Dates: 1/11/2022 TO 2/9/2022 (30 days).   Frequency/Duration: 2 times a week for 30 Days       Total Treatment Billable Duration:  54 Min  PT Patient Time In/Time Out  Time In: 0900  Time Out: [facility], PT    Future Appointments   Date Time Provider Liana Bates   1/17/2022  1:45 PM Kelly Marquez PT [facility]   1/20/2022  9:45 AM [facility]   1/24/2022  1:45 PM [facility]   1/27/2022 11:15 AM Kelly Marquez PT [facility]   1/31/2022  9:00 AM Sherwin Marks Derian Yun, PT [facility]   2/3/2022  1:45 PM Derian Prescott, PT [facility]

## 2022-01-20 ENCOUNTER — HOSPITAL ENCOUNTER (OUTPATIENT)
Dept: PHYSICAL THERAPY | Age: 57
Discharge: HOME | End: 2022-01-20
Payer: COMMERCIAL

## 2022-01-20 PROCEDURE — 97110 THERAPEUTIC EXERCISES: CPT

## 2022-01-20 NOTE — PROGRESS NOTES
Aashish Wall  : 1965  Payor: Amee Seymour / [place] [facility] / Product Type: PPO /  [address] [facility] at [address] [place] [address]. [facility], [address] [place] [address], [address] [place] [address]  [phone]   [phone]                                                          Referred, Marc, MD      OUTPATIENT PHYSICAL THERAPY: Daily Treatment Note 2022   Visit Count:  3    Tx Diagnosis:  Pain in right hip (M25.551)  Psoas tendinitis, right hip (M76.11)      Pre-treatment Symptoms/Complaints: . Pt states glute exercise hurt low back so stopped doing that at home. wasw sore after last treatment but no right groin pain over the last 3 days --    Pain: Initial:0/10  Medications Last Reviewed:  2022     Post Session: 0/10   Updated Objective Findings:         TREATMENT:   THERAPEUTIC EXERCISE: (40 minutes):  Exercises per grid below to improve mobility, strength and balance. Required minimal visual, verbal and manual cues to promote proper body alignment and promote proper body posture. Progressed resistance and complexity of movement as indicated. [employer] Portal Date:   Date:  21 Date:     Activity/Exercise Parameters Parameters Parameters   Education HEP, POC, PT goals, anatomy/pathology     Clamshells  X 10, 5 sec B     Prone hip ext with knee flexion  X 10, 5 sec B  Discontinue     1/2 kneel hip flexor stretch  3 x 10 B     nustep  8 min twin peaks, level 3  10 min  Level 5     Side stepping   4 laps with med resist band (red) in hallway     Monster steps   Fwd and backward x 2 laps each in hallway           THERAPEUTIC ACTIVITY: ( 0 minutes): Activities per gid below to improve functional movement related mobility, strength and balance to improve neuro-muscular carryover to daily functional activities for improving patient's quality of life.  Required visual, verbal and manual cues to promote proper body alignment and promote proper body posture/mechanics. Progressed resistance and complexity of movement as indicated. Date:  1/20/2022 Date:   Date:     Activity/Exercise Parameters Parameters Parameters                                                                               MANUAL THERAPY: 25 minutes): Joint mobilization, Soft tissue mobilization was utilized and necessary because of the patient's restricted joint motion and restricted motion of soft tissue mobility. Date  1/20/2022    Technique Used Grade  Level # Time(s) Desired effect/clinical reasoning   STM/DTM   Right iliopsoas  20 mi Decreased tone of hip flexor    IR/ER right hip (passive)  Right  2 bouts  Increased mobility    Joint mob: CECY's  IV- L4-5 3 bouts  Decreased joint restriction    Medial rotation hip   Right  3 bouts  Decrease joint restriction                           *included in HEP      Treatment/Session Summary:    Response to Treatment: Pt with less tenderness of iliopsoas today during manual..  Added strengthening and stretch for HEP to maintain gains in clinic. Pt demonstrates independence and understanding of HEP. No pain during or at the end of session today. Focused on functional hip and core strengthening without manual work. Pt performed all above well with mild cuing for technique and demonstrated a good understanding of new HEP      Communication/Consultation:     Equipment provided today: HEP Handout   Recommendations/Intent for next treatment session:   Next visit will focus on Manual Therapy Core Stability Pain Science Education Quad strengthening Hip strengthening. Treatment Plan of Care Effective Dates: 1/11/2022 TO 2/9/2022 (30 days).   Frequency/Duration: 2 times a week for 30 Days       Total Treatment Billable Duration:  36 Min  PT Patient Time In/Time Out  Time In: 0946  Time Out: 860 [facility], PT    Future Appointments   Date Time Provider Liana Bates   1/24/2022  1:45 PM Kenyon Roe Aden, PT [facility]   1/27/2022 11:15 AM Marsha House, PT [facility]   1/31/2022  9:00 AM Marsha House, PT [facility]   2/3/2022  1:45 PM Bernadette Flores, Roe Aden[facility]

## 2022-01-20 NOTE — HPI: SKIN LESIONS
How Severe Is Your Skin Lesion?: moderate
Have Your Skin Lesions Been Treated?: not been treated
Is This A New Presentation, Or A Follow-Up?: Skin Lesions
Disorientation/Impaired Gait

## 2022-01-24 ENCOUNTER — HOSPITAL ENCOUNTER (OUTPATIENT)
Dept: PHYSICAL THERAPY | Age: 57
Discharge: HOME | End: 2022-01-24
Payer: COMMERCIAL

## 2022-01-24 PROCEDURE — 97110 THERAPEUTIC EXERCISES: CPT

## 2022-01-24 NOTE — PROGRESS NOTES
Noni Granger  : 1965  Payor: Marci Meza / [place]: [place] [facility] / Product Type: PPO /  [address]. [facility], [address] [place] [address], [address] [place] [address]  [phone]   [phone]                                                          Referred, Marc, MD      OUTPATIENT PHYSICAL THERAPY: Daily Treatment Note 2022   Visit Count:  4    Tx Diagnosis:  Pain in right hip (M25.551)  Psoas tendinitis, right hip (M76.11)      Pre-treatment Symptoms/Complaints: Pt states that she is focusing on her HEP. Has not tried to walk on treadmill yet but no pain of groin over the last week. Pain: Initial:0/10  Medications Last Reviewed:  2022     Post Session: 0/10   Updated Objective Findings:         TREATMENT:   THERAPEUTIC EXERCISE: (55 minutes):  Exercises per grid below to improve mobility, strength and balance. Required minimal visual, verbal and manual cues to promote proper body alignment and promote proper body posture. Progressed resistance and complexity of movement as indicated. [employer] Portal Date:   Date:  22 Date:  22   Activity/Exercise Parameters Parameters Parameters   Education HEP, POC, PT goals, anatomy/pathology     Clamshells  X 10, 5 sec B     Prone hip ext with knee flexion  X 10, 5 sec B  Discontinue  X 5 B standing   X 3 quadrant    1/2 kneel hip flexor stretch  3 x 10 B  X 2 B with cuing    nustep  8 min twin peaks, level 3  10 min  Level 5  10 min level 6 twin peaks    Side stepping   4 laps with med resist band (red) in hallway  2 laps with RTB (4 x 30 ft)    Monster steps   Fwd and backward x 2 laps each in hallway  Fwd and backward x 2 laps   Sit to stand    15# KB x 15    Step overs    NV   Mayorga carry    15# DBs 350 ft            Previous to current therapy : 45 min -1 hr treadmill prior to pain       THERAPEUTIC ACTIVITY: ( 0 minutes):  Activities per gid below to improve functional movement related mobility, strength and balance to improve neuro-muscular carryover to daily functional activities for improving patient's quality of life. Required visual, verbal and manual cues to promote proper body alignment and promote proper body posture/mechanics. Progressed resistance and complexity of movement as indicated. Date:  1/24/2022 Date:   Date:     Activity/Exercise Parameters Parameters Parameters                                                                               MANUAL THERAPY: 0 minutes): Joint mobilization, Soft tissue mobilization was utilized and necessary because of the patient's restricted joint motion and restricted motion of soft tissue mobility. Date  1/24/2022    Technique Used Grade  Level # Time(s) Desired effect/clinical reasoning   STM/DTM   Right iliopsoas  20 mi Decreased tone of hip flexor    IR/ER right hip (passive)  Right  2 bouts  Increased mobility    Joint mob: CECY's  IV- L4-5 3 bouts  Decreased joint restriction    Medial rotation hip   Right  3 bouts  Decrease joint restriction                           *included in HEP      Treatment/Session Summary:    Response to Treatment:     Continued focused on functional hip and core strengthening with mild cuing for technique and demonstrated overall good understanding of new HEP. Communication/Consultation:     Equipment provided today: HEP Handout   Recommendations/Intent for next treatment session:   Next visit will focus on Manual Therapy Core Stability Pain Science Education Quad strengthening Hip strengthening. Treatment Plan of Care Effective Dates: 1/11/2022 TO 2/9/2022 (30 days).   Frequency/Duration: 2 times a week for 30 Days       Total Treatment Billable Duration:  36 Min     Jonathan Lopez PT    Future Appointments   Date Time Provider Liana Bates   1/27/2022 11:15 AM RUDDY [facility]   1/31/2022  9:00 AM Kenyon Ildefonso Reece, PT [facility]   2/3/2022  1:45 PM Ildefonso Prescott, PT [facility]

## 2022-01-27 ENCOUNTER — HOSPITAL ENCOUNTER (OUTPATIENT)
Dept: PHYSICAL THERAPY | Age: 57
Discharge: HOME | End: 2022-01-27
Payer: COMMERCIAL

## 2022-01-27 PROCEDURE — 97530 THERAPEUTIC ACTIVITIES: CPT

## 2022-01-27 PROCEDURE — 97110 THERAPEUTIC EXERCISES: CPT

## 2022-01-27 NOTE — PROGRESS NOTES
William Casiano  : 1965  Payor: Nahomy Loving / Plan: [facility] / Product Type: PPO /  [address]. [facility], [address] [place] [address], [facility], [address] [place] [address]  [phone]   [phone]                                                          Referred, Self, MD      OUTPATIENT PHYSICAL THERAPY: Daily Treatment Note 2022   Visit Count:  5    Tx Diagnosis:  Pain in right hip (M25.551)  Psoas tendinitis, right hip (M76.11)      Pre-treatment Symptoms/Complaints: States groin pulling sensation when walking on treadmill at home no grade, 3mph, x 10 min. Discomfort resolved when not walking. Pain: Initial:0/10  Medications Last Reviewed:  2022     Post Session: 0/10   Updated Objective Findings:         TREATMENT:   THERAPEUTIC EXERCISE: (35 minutes):  Exercises per grid below to improve mobility, strength and balance. Required minimal visual, verbal and manual cues to promote proper body alignment and promote proper body posture. Progressed resistance and complexity of movement as indicated.     [employer] Portal Date:   Date:  22 Date:  22   Activity/Exercise Parameters Parameters Parameters    Education HEP, POC, PT goals, anatomy/pathology      Clamshells  X 10, 5 sec B      Prone hip ext with knee flexion  X 10, 5 sec B  Discontinue  X 5 B standing   X 3 quadrant     1/2 kneel hip flexor stretch  3 x 10 B  X 2 B with cuing     nustep  8 min twin peaks, level 3  10 min  Level 5  10 min level 6 twin peaks     Side stepping   4 laps with med resist band (red) in hallway  2 laps with RTB (4 x 30 ft)  2 laps with RTB (4 x 30 ft)    Monster steps   Fwd and backward x 2 laps each in hallway  Fwd and backward x 2 laps Fwd and backward x 2 laps   Sit to stand    15# KB x 15     Step overs    NV TA   Mayorga carry    15# DBs 350 ft     DL     NV   treadmill    3mph, 0 grade, x 11 min 0.5 miles     Previous to current therapy : 45 min -1 hr treadmill prior to pain       THERAPEUTIC ACTIVITY: ( 10 minutes): Activities per gid below to improve functional movement related mobility, strength and balance to improve neuro-muscular carryover to daily functional activities for improving patient's quality of life. Required visual, verbal and manual cues to promote proper body alignment and promote proper body posture/mechanics. Progressed resistance and complexity of movement as indicated. Date:  1/27/2022 Date:   Date:     Activity/Exercise Parameters Parameters Parameters   Step over tap In front of mirror fwd tap  Focus: lumbopelvic stability with weight shift and SLS                                                                         MANUAL THERAPY: 0 minutes): Joint mobilization, Soft tissue mobilization was utilized and necessary because of the patient's restricted joint motion and restricted motion of soft tissue mobility. Date  1/27/2022    Technique Used Grade  Level # Time(s) Desired effect/clinical reasoning   STM/DTM   Right iliopsoas  20 mi Decreased tone of hip flexor    IR/ER right hip (passive)  Right  2 bouts  Increased mobility    Joint mob: CECY's  IV- L4-5 3 bouts  Decreased joint restriction    Medial rotation hip   Right  3 bouts  Decrease joint restriction                           *included in HEP      Treatment/Session Summary:    Response to Treatment: Observed symmetrical gait on treadmill with no obvious cause of hip flexor irritation. No report of discomfort with this activity. Continued focused on functional hip and core strengthening. Visual and verbal cuing needed to activate core and gluteals during SLS activity today. Encouraged pt to practice at home in front of mirror to improve hip strategy.         Communication/Consultation:     Equipment provided today:    Recommendations/Intent for next treatment session:   Next visit will focus on Manual Therapy Core Stability Pain Science Education Quad strengthening Hip strengthening. Treatment Plan of Care Effective Dates: 1/11/2022 TO 2/9/2022 (30 days).   Frequency/Duration: 2 times a week for 30 Days       Total Treatment Billable Duration:  39 Min  PT Patient Time In/Time Out  Time In: 1115  Time Out: Jas Landrum PT    Future Appointments   Date Time Provider Liana Bates   1/31/2022  9:00 AM Princess Haq, PT [facility]   2/3/2022  1:45 PM Janak Prescott, PT [facility]

## 2022-01-31 ENCOUNTER — HOSPITAL ENCOUNTER (OUTPATIENT)
Dept: PHYSICAL THERAPY | Age: 57
Discharge: HOME | End: 2022-01-31
Payer: COMMERCIAL

## 2022-01-31 PROCEDURE — 97110 THERAPEUTIC EXERCISES: CPT

## 2022-01-31 PROCEDURE — 97140 MANUAL THERAPY 1/> REGIONS: CPT

## 2022-01-31 NOTE — PROGRESS NOTES
Noni Granger  : 1965  Payor: Marci Meza / Plan: [place] [facility] / Product Type: PPO /  [place] [address]. [facility], [address] [place] [address], [facility], [address] [place] [address]  [phone]   [phone]                                                          Referred, Self, MD      OUTPATIENT PHYSICAL THERAPY: Daily Treatment Note 2022   Visit Count:  6    Tx Diagnosis:  Pain in right hip (M25.551)  Psoas tendinitis, right hip (M76.11)      Pre-treatment Symptoms/Complaints:   Pt reports groin discomfort increases with increased speed as she is taking longer strides. Able to walk<3.0mph for 10 min without discomfort but does not feel like she gets a workout. Pain: Initial:0/10  Medications Last Reviewed:  2022     Post Session: 0/10   Updated Objective Findings: Postural assessment: standing--Right ic lower, right psis elevated,   Active movement: standing lumbar flexion--  Hamstring pull,  Supine:  LLE shorter; supine to sit: left still short         TREATMENT:   THERAPEUTIC EXERCISE: (20 minutes):  Exercises per grid below to improve mobility, strength and balance. Required minimal visual, verbal and manual cues to promote proper body alignment and promote proper body posture. Progressed resistance and complexity of movement as indicated.     [employer] Portal Date:   Date:  22 Date:  22   Activity/Exercise Parameters Parameters Parameters     Education HEP, POC, PT goals, anatomy/pathology       Clamshells  X 10, 5 sec B       Prone hip ext with knee flexion  X 10, 5 sec B  Discontinue  X 5 B standing   X 3 quadrant   X 5    1/2 kneel hip flexor stretch  3 x 10 B  X 2 B with cuing      nustep  8 min twin peaks, level 3  10 min  Level 5  10 min level 6 twin peaks      Side stepping   4 laps with med resist band (red) in hallway  2 laps with RTB (4 x 30 ft)  2 laps with RTB (4 x 30 ft)     Monster steps   Fwd and backward x 2 laps each in hallway  Fwd and backward x 2 laps Fwd and backward x 2 laps    Sit to stand    15# KB x 15      Step overs    NV TA    Mayorga carry    15# DBs 350 ft      DL     NV    treadmill    3mph, 0 grade, x 11 min 0.5 miles 14 min 2.5-3.0mph   Grade 4-8      Previous to current therapy : 45 min -1 hr treadmill prior to pain       THERAPEUTIC ACTIVITY: ( 10 minutes): Activities per gid below to improve functional movement related mobility, strength and balance to improve neuro-muscular carryover to daily functional activities for improving patient's quality of life. Required visual, verbal and manual cues to promote proper body alignment and promote proper body posture/mechanics. Progressed resistance and complexity of movement as indicated. Date:  1/27/2022 Date:  1/31/22 Date:     Activity/Exercise Parameters Parameters Parameters   Step over tap In front of mirror fwd tap  Focus: lumbopelvic stability with weight shift and SLS       AROM and posture   Addressing asymmetries of pelvis and LE length  Heel lift admin, instruction and assessed during standing and walking                                                              MANUAL THERAPY:(25 minutes): Joint mobilization, Soft tissue mobilization was utilized and necessary because of the patient's restricted joint motion and restricted motion of soft tissue mobility. Date  1/31/2022    Technique Used Grade  Level # Time(s) Desired effect/clinical reasoning   STM/DTM   Left ischial tuber attatchements 10 mi Decreased tone and discomfort    IR/ER right hip (passive)  Right  2 bouts  Increased mobility    Joint mob: CECY's  IV- sacrum 2 bouts  Decrease stiffness                                  *included in HEP      Treatment/Session Summary:    Response to Treatment: Due to reports of groin and ischial tuberosity pain, evaluated and addressed leg length discrepancy. Heel lift admin for left shoe. Homework: encouraged to perform <3.0 mph on treadmill with high grade incline x total 15 min (with 2 min warm up and 2 min cool down)    Communication/Consultation:  HEP walking; jos heel lift in left shoe    Equipment provided today:    Recommendations/Intent for next treatment session:   Next visit will focus on Manual Therapy Core Stability Pain Science Education Quad strengthening Hip strengthening. Treatment Plan of Care Effective Dates: 1/11/2022 TO 2/9/2022 (30 days).   Frequency/Duration: 2 times a week for 30 Days       Total Treatment Billable Duration:  48 Min  PT Patient Time In/Time Out  Time In: 0900  Time Out: RUDDY Rider    Future Appointments   Date Time Provider Liana Bates   2/3/2022  1:45 PM Jeremiah Byrd PT [facility] AND [facility]

## 2022-02-03 ENCOUNTER — HOSPITAL ENCOUNTER (OUTPATIENT)
Dept: PHYSICAL THERAPY | Age: 57
Discharge: HOME | End: 2022-02-03
Payer: COMMERCIAL

## 2022-02-03 PROCEDURE — 97530 THERAPEUTIC ACTIVITIES: CPT

## 2022-02-03 PROCEDURE — 97110 THERAPEUTIC EXERCISES: CPT

## 2022-02-03 PROCEDURE — 97140 MANUAL THERAPY 1/> REGIONS: CPT

## 2022-02-03 NOTE — PROGRESS NOTES
Corrinne Augusta  : 1965  Payor: William Mckeon / Plan: [place] [facility] / Product Type: PPO /  [address] [facility] at [address] [place] [address]. [facility], [address], [id number] [place] [address]  [phone]   [phone]                                                          Jason Humphreys      OUTPATIENT PHYSICAL THERAPY: Daily Treatment Note 2/3/2022   Visit Count:  7    Tx Diagnosis:  Pain in right hip (M25.551)  Psoas tendinitis, right hip (M76.11)      Pre-treatment Symptoms/Complaints:   Pt states wearing heel lift for 2 hours at a time aprox 3 times over the last few days. While wearing, no increase of symptoms while donning lift however, in between wear schedule,  has experienced symptoms such as low back stiffness, right groin pain and right heel and lateral foot pain waxing and waning over the last 2 days even disrupting sleep. These symptoms did not seem to follow a pattern and were resolved with rest and antiinflammatories. Went to MD to get referral to continue therapy --also has left shoulder pain would like to address. Pain: Initial:0/10  Medications Last Reviewed:  2/3/2022     Post Session: 0/10   Updated Objective Findings: Postural assessment: standing--Right ic lower, right psis elevated,   Active movement: standing lumbar flexion--  Hamstring pull,  Supine:  LLE shorter; supine to sit: left still short   pez planus B           GOALS: (Goals have been discussed and agreed upon with patient.)    Short-Term Goals~4 weeks  Goal Met   1. Corrinne Augusta will be independent with HEP for strength and ROM  [x]? Date:   2. Corrinne Augusta will tolerate manual therapy/joint mobilizations to increase knee flexion ROM so pt can ambulate stairs and walk with a more normalized gait pattern. []? Date:   3. Corrinne Augusta will participate in core and LE strengthening exercises. []? Date:   5.  Corrinne Augusta will participate in static and dynamic balance activities for 5 minutes to help improve proprioception and decrease risk of falls  []? Date:   6. Claudetta Rainbow to increase lower extremity functional scale by 10 points to show improvement in areas of difficulty  []? Date:   5. Claudetta Rainbow will improve MMT B hips LE to >=4+/5 to improve current level of independence and community reintegration. []? Date:             Long Term Goals~8 weeks Goal Met   1. Claudetta Rainbow will be independent in HEP of stretching and strengthening. []? Date:   3. Claudetta Rainbow will ascend/descend 12 steps with reciprocal gait pattern and rail  []? Date:   4. Claudetta Rainbow to increase lower extremity functional scale by 20 points to show improvement in areas of difficulty   []? Date:   Urszula Wiggins will be able to stand/walk 10 min or greater without symptom onset.      Urszula Wiggins will report no symptoms radicular symptoms of right LE.                                       TREATMENT:   THERAPEUTIC EXERCISE: (20 minutes):  Exercises per grid below to improve mobility, strength and balance. Required minimal visual, verbal and manual cues to promote proper body alignment and promote proper body posture. Progressed resistance and complexity of movement as indicated.     [employer] Portal Date:   Date:  1/20/22 Date:  1/24/22 1/27/22 1/31/22 2/3/22   Activity/Exercise Parameters Parameters Parameters      Education HEP, POC, PT goals, anatomy/pathology        Clamshells  X 10, 5 sec B        Prone hip ext with knee flexion  X 10, 5 sec B  Discontinue  X 5 B standing   X 3 quadrant   X 5     1/2 kneel hip flexor stretch  3 x 10 B  X 2 B with cuing       nustep  8 min twin peaks, level 3  10 min  Level 5  10 min level 6 twin peaks       Side stepping   4 laps with med resist band (red) in hallway  2 laps with RTB (4 x 30 ft)  2 laps with RTB (4 x 30 ft)   Yellow band 2 x 80 ft    Monster steps   Fwd and backward x 2 laps each in hallway  Fwd and backward x 2 laps Fwd and backward x 2 laps   Yellow band 2 x 80 ft   Sit to stand    15# KB x 15       Step overs    NV TA     Mayorga carry    15# DBs 350 ft       DL     NV     treadmill    3mph, 0 grade, x 11 min 0.5 miles 14 min 2.5-3.0mph   Grade 4-8       Previous to current therapy : 45 min -1 hr treadmill prior to pain       THERAPEUTIC ACTIVITY: ( 15 minutes): Activities per gid below to improve functional movement related mobility, strength and balance to improve neuro-muscular carryover to daily functional activities for improving patient's quality of life. Required visual, verbal and manual cues to promote proper body alignment and promote proper body posture/mechanics. Progressed resistance and complexity of movement as indicated. Date:  1/27/2022 Date:  1/31/22 Date:     Activity/Exercise Parameters Parameters Parameters   Step over tap In front of mirror fwd tap  Focus: lumbopelvic stability with weight shift and SLS       AROM and posture   Addressing asymmetries of pelvis and LE length  Heel lift admin, instruction and assessed during standing and walking      Gait assess     5 min    Weight bearing and non WBing foot-ankle     10 min                                       MANUAL THERAPY:(30 minutes): Joint mobilization, Soft tissue mobilization was utilized and necessary because of the patient's restricted joint motion and restricted motion of soft tissue mobility.         Date  2/3/22    Technique Used Grade  Level # Time(s) Desired effect/clinical reasoning   STM/DTM   Right hip flexor 15 mi Decreased tone and discomfort    IR/ER right hip (passive)  Right  2 bouts  Increased mobility    Joint mob: PA and inf  IV- Left SC  2 bouts  Decrease stiffness and pain                                   *included in HEP      Treatment/Session Summary:    Response to Treatment: Lengthy assessment and discussion today regarding symptoms flare up as well as heel lift regimen. Assessment of gait, footwear, subjective report ---recommended to jos heel lift more consistently with more stable sneakers for 2 days, performing strengthening exercises as well as daily tasks to truly assess effect. inconsistent wear of heel lift may have contributed to irritated joints. Pt understand and is agreeable. Advised to discontinue wear if symptoms continue to flare. Communication/Consultation:  New order from MD sent POC for MD review/signature    Equipment provided today:    Recommendations/Intent for next treatment session:   Next visit will focus on Manual Therapy Core Stability Pain Science Education Quad strengthening Hip strengthening. Treatment Plan of Care Effective Dates: 2/3/22 to 4/4/2022  (60  days).   Frequency/Duration: 2 times a week for 30 Days       Total Treatment Billable Duration:  61 Min  PT Patient Time In/Time Out  Time In: 3406  Time Out: [address], PT    Future Appointments   Date Time Provider Liana Bates   2/7/2022 10:30 AM Malini Wise, PT [facility]   2/10/2022  9:00 AM Malini Wise, PT [facility]   2/14/2022 10:30 AM Malini Wise, PT [facility]   2/17/2022  1:00 PM Malini Wise, PT [facility]   2/23/2022  1:00 PM Malini Wise, PT [facility]   2/25/2022 11:15 AM Malini Wise, PT [facility]   2/28/2022 10:30 AM Campos Solomon, PT [facility]

## 2022-02-03 NOTE — THERAPY RECERTIFICATION
Chhaya Stokes  : 1965  Primary: [facility]*  Secondary:  [facility] Dina [address]  [address] [place] [address], [address]  [phone]   [phone]         OUTPATIENT PHYSICAL THERAPY:Recertification [id number]    ICD-10: Treatment Diagnosis:  Pain in right hip (M25.551)  Psoas tendinitis, right hip (M76.11)    Precautions/Allergies:   Codeine and Percocet [oxycodone-acetaminophen]   MD Orders: Eval and Treat  MEDICAL/REFERRING DIAGNOSIS:  Strain of right groin [S76.211A]   Right hip pain M25.551  Left shoulder pain M25.512    DATE OF ONSET: 3 weeks ago  REFERRING PHYSICIAN: Gurinder Ward MD  RETURN PHYSICIAN APPOINTMENT: TBA      ASSESSMENT:  Ms. Martin Nye has attended 7 visits for treatment of right hip/groin pain. She has demonstrated S/S are consistent with iliopsoas strain, with alignment abnormalities such as leg length discrepancy and B pez planus. Pt has made some progress toward below goals however,  will benefit from continued skilled physical therapy for manual therapeutic techniques (as appropriate), therapeutic exercises and activities, balance and comprehensive home exercises program to address current impairments and functional limitations. Chhaya Stokes will benefit from skilled PT (medically necessary) in order to address above deficits affecting participation in basic ADLs and overall functional tolerance. PROBLEM LIST (Impacting functional limitations):   1. Decreased Strength  2. Decreased ADL/Functional Activities  3. Decreased Transfer Abilities  4. Decreased Ambulation Ability/Technique  5. Decreased Balance  6. Increased Pain  7. Decreased Joint mobility/Flexibility   8. Decreased Activity Tolerance  9. Decreased [place] with Home Exercise Program INTERVENTIONS PLANNED:   1. Balance Exercise  2. Cryotherapy  3. Gait Training  4. Heat  5. Home Exercise Program (HEP)  6. Manual Therapy  7.  Neuromuscular Re-education/Strengthening  8. Range of Motion (ROM)  9. Therapeutic Activites  10. Therapeutic Exercise/Strengthening  11. Transfer Training  12. Ultrasound (US)            TREATMENT PLAN:  Effective Dates: 2/3/22 to 4/4/2022  Frequency/Duration: 2 times a week for 60 Days    GOALS: (Goals have been discussed and agreed upon with patient.)   Short-Term Goals~4 weeks  Goal Met   1. William Casiano will be independent with HEP for strength and ROM   [x] Date:   2. William Casiano will tolerate manual therapy/joint mobilizations to increase knee flexion ROM so pt can ambulate stairs and walk with a more normalized gait pattern.  [] Date:   3. William Casiano will participate in core and LE strengthening exercises. [] Date:   5. William Casiano will participate in static and dynamic balance activities for 5 minutes to help improve proprioception and decrease risk of falls   [] Date:   6. William Casiano to increase lower extremity functional scale by 10 points to show improvement in areas of difficulty   [] Date:   [facility] will improve MMT B hips LE to >=4+/5 to improve current level of independence and community reintegration. [] Date:         Long Term Goals~8 weeks Goal Met   1. William Casiano will be independent in HEP of stretching and strengthening.   []    3. William Casiano will ascend/descend 12 steps with reciprocal gait pattern and rail   []    4. William Casiano to increase lower extremity functional scale by 20 points to show improvement in areas of difficulty    []     William Casiano will be able to stand/walk 10 min or greater without symptom onset. []    William Casiano will report no symptoms radicular symptoms of right LE.   []    William Casiano will report no pain of left shoulder with all AROM and daily tasks. Outcome Measure:    Tool Used: Lower Extremity Functional Scale (LEFS)  Score:  Initial: 42/80 Most Recent: 39/80 (Date: 2/3/22 )   Interpretation of Score: 20 questions each scored on a 5 point scale with 0 representing \"extreme difficulty or unable to perform\" and 4 representing \"no difficulty\". The lower the score, the greater the functional disability. 80/80 represents no disability. Minimal detectable change is 9 points. Medical Necessity:   · Skilled intervention continues to be required due to current impairment. Reason for Services/Other Comments:  · Patient continues to require skilled intervention due to patient continues to present with impairments assessed at initial evaluation and requiring skilled physical therapy to meet goals for PT. Total Treatment Duration:   PT Patient Time In/Time Out  Time In: 1345  Time Out: 1500    Rehabilitation Potential For Stated Goals: Good  Regarding Pérez Garcia Antoine's therapy, I certify that the treatment plan above will be carried out by a therapist or under their direction. Thank you for this referral,  Claris Bence, PT     Referring Physician Signature: Elenita Flores MD              Date                  HISTORY:   History of Present Injury/Illness (Reason for Referral):  2 years ago pulled right groin took approx 1 year to resolve. Recently, pt started daily walking routine for fitness involving on treadmill every day. 2-3 weeks ago  groin pain began and progressed to right lateral leg numbness.   Backed off of exercise routine but did not help much to reduce symptoms.     -Present Symptoms (on day of evaluation): constant ache and tender anterior hip, right lateral lower leg decreased sensation intermittent        Pain Severity:  · Currently: 3/10  · Best: 3/10  · Worst: 8/10    · Aggravating factors: lifting heavy objects , > 5 min walking, incline walking, stair climbing    · Relieving factors: ice application, rest   · Irritability: Medium (Onset of pain is equal to alleviation)    Past Medical History/Comorbidities:   Ms. Oleary Less  has a past medical history of Depression, HTN (hypertension)   Low Risk (1/8/2016), Low serum vitamin D, Migraines, and Mixed hyperlipidemia (1/8/2016). Ms. Chuy Willis  has a past surgical history that includes hx orthopaedic; hx gyn; hx colonoscopy; and hx hysterectomy. 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   Prior Level of Function/Work/Activity:  Retired   Previous Treatment Approach  none  Other Clinical Tests:  none  Current Medications:    Current Outpatient Medications:     buPROPion XL (WELLBUTRIN XL) 150 mg tablet, , Disp: , Rfl:     mupirocin (BACTROBAN) 2 % ointment, , Disp: , Rfl:     suvorexant (BELSOMRA) 10 mg tablet, Take  by mouth nightly as needed for Insomnia., Disp: , Rfl:     ibuprofen (MOTRIN) 600 mg tablet, Take 1 Tab by mouth two (2) times a day., Disp: 30 Tab, Rfl: 0    tavaborole (KERYDIN) 5 % moise, by Apply Externally route., Disp: , Rfl:     ezetimibe-simvastatin (VYTORIN) 10-40 mg per tablet, Take 1 Tab by mouth nightly., Disp: 90 Tab, Rfl: 3    estradiol (ESTRACE) 1 mg tablet, Take 1 Tab by mouth daily. , Disp: 90 Tab, Rfl: 3    augmented betamethasone dipropionate (DIPROLENE-AF) 0.05 % ointment, Apply  to affected area two (2) times a day., Disp: 15 g, Rfl: 1    EPINEPHrine (EPIPEN) 0.3 mg/0.3 mL injection, 0.3 mL by IntraMUSCular route once as needed for up to 1 dose., Disp: 2 Syringe, Rfl: 0    calcium citrate-vitamin D3 (CITRACAL WITH VITAMIN D MAXIMUM) tablet, Take  by mouth two (2) times a day., Disp: , Rfl:     omega-3 fatty acids-vitamin e (FISH OIL) 1,000 mg cap, Take 1 Cap by mouth., Disp: , Rfl:    coenzyme q10 (CO Q-10) 10 mg cap, Take  by mouth., Disp: , Rfl:     aspirin 81 mg chewable tablet, Take 81 mg by mouth daily. , Disp: , Rfl:       Ambulatory/Rehab Services H2 Model Falls Risk Assessment    Risk Factors:       No Risk Factors Identified Ability to Rise from Chair:       (0)  Ability to rise in a single movement    Falls Prevention Plan:       No modifications necessary   Total: (5 or greater = High Risk): 0    ©2010 [employer]. All Rights Reserved. [facility] Patent #[id number]. Federal Law prohibits the replication, distribution or use without written permission from [employer]         Date Last Reviewed:  2/3/2022   EXAMINATION:   Observation/Orthostatic Postural Assessment:   Gait:  Decreased scotty, mild decreased hip ext right   Standing alignment:   · Right PSIS elevated compared to left   · Right LE longer than left   · Right left lumbar shift present   · B pronation ankle/foot  Palpation:    Tender of iliopsoas     MMT        Joint: Eval Date: 1/10/21  Re-Assess Date:    Strength  RIGHT LEFT RIGHT LEFT   Knee Extension 4+/5 4+/5        Knee Flexion 4+/5 4+/5       Hip abduction  4/5 4/5       Hip Flexion 4/5 4/5        Ankle DF         Ankle PF                           ROM:  · LE: mild pain with right hip quadrant and passive hip extension   · Lumbar AROM WNL increased ant hip pull with extension and left rotation      Functional Mobility: Limited tolerance of walking and standing  Sit to Stand= normal mechanics, slow  Squat= NV  Single Leg Step Down= NV    Balance:   30 second STS= NV     See treatment note for associated treatment provided today.       Future Appointments   Date Time Provider Liana Bates   2/7/2022 10:30 AM Monique Horta PT [facility]   2/10/2022  9:00 AM Monique Horta PT [facility]   2/14/2022 10:30 AM RUDDY Back [facility]   2/17/2022  1:00 PM [facility]un[facility]   2/23/2022  1:00 PM Janeth Duque, PT [facility] [place] [facility]   2/25/2022 11:15 AM Janeth Duque, PT [facility]   2/28/2022 10:30 AM [facility]ard[facility]         Rosana Brandon, PT

## 2022-02-07 ENCOUNTER — HOSPITAL ENCOUNTER (OUTPATIENT)
Dept: PHYSICAL THERAPY | Age: 57
Discharge: HOME | End: 2022-02-07
Payer: COMMERCIAL

## 2022-02-07 PROCEDURE — 97110 THERAPEUTIC EXERCISES: CPT

## 2022-02-07 PROCEDURE — 97140 MANUAL THERAPY 1/> REGIONS: CPT

## 2022-02-07 NOTE — PROGRESS NOTES
Mendoza Marcus  : 1965  Payor: Marietta Walker / Plan: [place] [facility] / Product Type: PPO /  [address]. [address] [place] [address], [facility], [address] [place] [address]  [phone]   [phone]                                                          Elmo Maria      OUTPATIENT PHYSICAL THERAPY: Daily Treatment Note 2022   Visit Count:  8    Tx Diagnosis:  Pain in right hip (M25.551)  Psoas tendinitis, right hip (M76.11)      Pre-treatment Symptoms/Complaints:   Pt states she is doing much better than last session, wearing heel lift consistently and changed footwear to more stable shoe as discussed last session. Right groin pain still present but decreased; no low back pain, decreased heel pain, no lateral foot pain . Pt also reports joint mobilizations to left shoulder relieved left shoulder pain. Performing all HEP consistently with heel lift donned--no increased pain. Has not attempted walking routine. Pain: Initial:3/10  Medications Last Reviewed:  2022     Post Session: -2/10   Updated Objective Findings: Postural assessment: standing--Right ic lower, right psis elevated,   Active movement: standing lumbar flexion--  Hamstring pull,  Supine:  LLE shorter; supine to sit: left still short   pez planus B           GOALS: (Goals have been discussed and agreed upon with patient.)    Short-Term Goals~4 weeks  Goal Met   1. Mendoza Marcus will be independent with HEP for strength and ROM  [x]? Date:   2. Mendoza Marcus will tolerate manual therapy/joint mobilizations to increase knee flexion ROM so pt can ambulate stairs and walk with a more normalized gait pattern. []? Date:   3. Mendoza Marcus will participate in core and LE strengthening exercises. []? Date:   5.  Mendoza Marcus will participate in static and dynamic balance activities for 5 minutes to help improve proprioception and decrease risk of falls  []? Date:   6. Samuel Rawls to increase lower extremity functional scale by 10 points to show improvement in areas of difficulty  []? Date:   5. Samuel Rawls will improve MMT B hips LE to >=4+/5 to improve current level of independence and community reintegration. []? Date:             Long Term Goals~8 weeks Goal Met   1. Samuel Rawls will be independent in HEP of stretching and strengthening. []? Date:   3. Samuel Rawls will ascend/descend 12 steps with reciprocal gait pattern and rail  []? Date:   4. Samuel Rawls to increase lower extremity functional scale by 20 points to show improvement in areas of difficulty   []? Date:   Bhavya Contreras will be able to stand/walk 10 min or greater without symptom onset.      Bhavya Contreras will report no symptoms radicular symptoms of right LE.                                       TREATMENT:   THERAPEUTIC EXERCISE: (10 minutes):  Exercises per grid below to improve mobility, strength and balance. Required minimal visual, verbal and manual cues to promote proper body alignment and promote proper body posture. Progressed resistance and complexity of movement as indicated.     [employer] Portal Date:   Date:  1/20/22 Date:  1/24/22 1/27/22 1/31/22 2/3/22 2/7/22   Activity/Exercise Parameters Parameters Parameters       Education HEP, POC, PT goals, anatomy/pathology         Clamshells  X 10, 5 sec B         Prone hip ext with knee flexion  X 10, 5 sec B  Discontinue  X 5 B standing   X 3 quadrant   X 5      1/2 kneel hip flexor stretch  3 x 10 B  X 2 B with cuing        nustep  8 min twin peaks, level 3  10 min  Level 5  10 min level 6 twin peaks        Side stepping   4 laps with med resist band (red) in hallway  2 laps with RTB (4 x 30 ft)  2 laps with RTB (4 x 30 ft)   Yellow band 2 x 80 ft     Monster steps   Fwd and backward x 2 laps each in hallway  Fwd and backward x 2 laps Fwd and backward x 2 laps Yellow band 2 x 80 ft    Sit to stand    15# KB x 15        Step overs    NV TA      Mayorga carry    15# DBs 350 ft        DL     NV      treadmill    3mph, 0 grade, x 11 min 0.5 miles 14 min 2.5-3.0mph   Grade 4-8   10 min 2.5 mph mild incline (pain 2-3/10 groin)     Previous to current therapy : was walking 45 min -1 hr treadmill daily      THERAPEUTIC ACTIVITY: ( 0 minutes): Activities per gid below to improve functional movement related mobility, strength and balance to improve neuro-muscular carryover to daily functional activities for improving patient's quality of life. Required visual, verbal and manual cues to promote proper body alignment and promote proper body posture/mechanics. Progressed resistance and complexity of movement as indicated. Date:  1/27/2022 Date:  1/31/22 Date:  2/3/22 2/7/22   Activity/Exercise Parameters Parameters Parameters    Step over tap In front of mirror fwd tap  Focus: lumbopelvic stability with weight shift and SLS         AROM and posture   Addressing asymmetries of pelvis and LE length  Heel lift admin, instruction and assessed during standing and walking        Gait assess     5 min     Weight bearing and non WBing foot-ankle     10 min                                              MANUAL THERAPY:( 30 minutes): Joint mobilization, Soft tissue mobilization was utilized and necessary because of the patient's restricted joint motion and restricted motion of soft tissue mobility.         Date  2/7/22    Technique Used Grade  Level # Time(s) Desired effect/clinical reasoning   STM/DTM   Right hip flexor 20 mi Decreased tone and discomfort    IR/ER right hip (passive)  Right  3 bouts  Increased mobility    Joint mob: PA and inf  IV- Left SC joint   3 bouts  Decrease stiffness and pain left shoulder                                   *included in HEP      Treatment/Session Summary:    Response to Treatment: Symptoms reacted well to heel lift and footwear recommended at last session. Right shoulder work also relieved shoulder discomfort. Achieved 10 min on treadmill today without increase of right groin pain during. Focused more on manual to decrease abnormal muscle tone of hip flexor and decrease pain. No c/o's upon departure    Communication/Consultation:     Equipment provided today:    Recommendations/Intent for next treatment session:   Next visit will focus on Manual Therapy Core Stability Pain Science Education Quad strengthening Hip strengthening. Treatment Plan of Care Effective Dates: 2/3/22 to 4/4/2022  (60  days).   Frequency/Duration: 2 times a week for 30 Days       Total Treatment Billable Duration:  40 min  PT Patient Time In/Time Out  Time In: 1030  Time Out: Kal 49, PT    Future Appointments   Date Time Provider Liana Bates   2/10/2022  9:00 AM Carin Kumar, [facility]   2/14/2022 10:30 AM [facility]in [facility] PT [facility]   2/17/2022  1:00 PM Carin [facility] PT [facility]   2/23/2022  1:00 PM Carin [facility] PT [facility]   2/25/2022 11:15 AM Carin Kumar PT [facility]   2/28/2022 10:30 AM Rosana Lakhani, PT [facility]

## 2022-02-10 ENCOUNTER — HOSPITAL ENCOUNTER (OUTPATIENT)
Dept: PHYSICAL THERAPY | Age: 57
Discharge: HOME | End: 2022-02-10
Payer: COMMERCIAL

## 2022-02-10 PROCEDURE — 97140 MANUAL THERAPY 1/> REGIONS: CPT

## 2022-02-10 PROCEDURE — 97110 THERAPEUTIC EXERCISES: CPT

## 2022-02-10 NOTE — PROGRESS NOTES
Guzman Lee  : 1965  Payor: Rich Dawkins / Plan: [place] [facility] / Product Type: PPO /  [address] at [address]. [address] [place], [address]  [phone]   [phone]                                                          Naveed Ramirez      OUTPATIENT PHYSICAL THERAPY: Daily Treatment Note 2/10/2022   Visit Count:  9    Tx Diagnosis:  Pain in right hip (M25.551)  Psoas tendinitis, right hip (M76.11)      Pre-treatment Symptoms/Complaints:   Soreness left side neck after last visit; last 3 days resumed walking on treadmill x 12 min with varied speed and incline  and performing rest of HEP. On and off soreness of right groin (none over last 24 hours) chief c/o today is left UT pain. Pt voices some concern over technique of HEP and would like to go over specific exercises today. Pain: Initial:3/10  Medications Last Reviewed:  2/10/2022     Post Session: -2/10   Updated Objective Findings: Postural assessment: standing--Right ic lower, right psis elevated,   Active movement: standing lumbar flexion--  Hamstring pull,  Supine:  LLE shorter; supine to sit: left still short   pez planus B           GOALS: (Goals have been discussed and agreed upon with patient.)    Short-Term Goals~4 weeks  Goal Met   1. Guzman Lee will be independent with HEP for strength and ROM  [x]? Date:   2. Guzman Lee will tolerate manual therapy/joint mobilizations to increase knee flexion ROM so pt can ambulate stairs and walk with a more normalized gait pattern. []? Date:   3. Guzman Lee will participate in core and LE strengthening exercises. []? Date:   5. Guzman Lee will participate in static and dynamic balance activities for 5 minutes to help improve proprioception and decrease risk of falls  []? Date:   6.  Guzman Sax to increase lower extremity functional scale by 10 points to show improvement in areas of difficulty  []? Date:   5. Caitlin Robbins will improve MMT B hips LE to >=4+/5 to improve current level of independence and community reintegration. []? Date:             Long Term Goals~8 weeks Goal Met   1. Caitlin Robbins will be independent in HEP of stretching and strengthening. []? Date:   3. Caitlin Robbins will ascend/descend 12 steps with reciprocal gait pattern and rail  []? Date:   4. Caitlin Robbins to increase lower extremity functional scale by 20 points to show improvement in areas of difficulty   []? Date:   Darcy Armendariz will be able to stand/walk 10 min or greater without symptom onset. []? Date:   Darcy Armendariz will report no symptoms radicular symptoms of right LE.                                       TREATMENT:   THERAPEUTIC EXERCISE: (35 minutes):  Exercises per grid below to improve mobility, strength and balance. Required minimal visual, verbal and manual cues to promote proper body alignment and promote proper body posture. Progressed resistance and complexity of movement as indicated.     [employer] Portal Date:   Date:  1/20/22 Date:  1/24/22 1/27/22 1/31/22 2/3/22 2/7/22 2/10/22   Activity/Exercise Parameters Parameters Parameters        Education HEP, POC, PT goals, anatomy/pathology          Clamshells  X 10, 5 sec B          Prone hip ext with knee flexion  X 10, 5 sec B  Discontinue  X 5 B standing   X 3 quadrant   X 5       1/2 kneel hip flexor stretch  3 x 10 B  X 2 B with cuing         nustep  8 min twin peaks, level 3  10 min  Level 5  10 min level 6 twin peaks         Side stepping   4 laps with med resist band (red) in hallway  2 laps with RTB (4 x 30 ft)  2 laps with RTB (4 x 30 ft)   Yellow band 2 x 80 ft   YTB demo do   Monster steps   Fwd and backward x 2 laps each in hallway  Fwd and backward x 2 laps Fwd and backward x 2 laps   Yellow band 2 x 80 ft  YTB demon do   Sit to stand    15# KB x 15 Step overs    NV TA       Mayorga carry    15# DBs 350 ft         DL     NV       treadmill    3mph, 0 grade, x 11 min 0.5 miles 14 min 2.5-3.0mph   Grade 4-8   10 min 2.5 mph mild incline (pain 2-3/10 groin) 12 min 2.5-3.0    t-band shoulder ext            Free weight shoulder         Abduction, scaption, OH -- 10 min work on technique and decreasing UT and levator contraction                            Previous to current therapy : was walking 45 min -1 hr treadmill daily      THERAPEUTIC ACTIVITY: ( 0 minutes): Activities per gid below to improve functional movement related mobility, strength and balance to improve neuro-muscular carryover to daily functional activities for improving patient's quality of life. Required visual, verbal and manual cues to promote proper body alignment and promote proper body posture/mechanics. Progressed resistance and complexity of movement as indicated. Date:  1/27/2022 Date:  1/31/22 Date:  2/3/22 2/7/22   Activity/Exercise Parameters Parameters Parameters    Step over tap In front of mirror fwd tap  Focus: lumbopelvic stability with weight shift and SLS         AROM and posture   Addressing asymmetries of pelvis and LE length  Heel lift admin, instruction and assessed during standing and walking        Gait assess     5 min     Weight bearing and non WBing foot-ankle     10 min                                              MANUAL THERAPY:( 10 minutes): Joint mobilization, Soft tissue mobilization was utilized and necessary because of the patient's restricted joint motion and restricted motion of soft tissue mobility.         Date  2/7/22    Technique Used Grade  Level # Time(s) Desired effect/clinical reasoning   STM/DTM   scap elevators B  10 mi Decreased tone and discomfort   IR/ER right hip (passive)  Right  Not today Increased mobility    Joint mob: PA and inf  IV-  Not today  Decrease stiffness and pain left shoulder *included in HEP      Treatment/Session Summary:    Response to Treatment: Refined upper and lower HEP with bands and weights---treadmill 12 min incline 3 --dicussed shorter steps and engaged core during walking program at home to decrease strain on hip flexor and low back. Cuing needed for some shoulder exercises for posture and position as well as advising of decreasing weight to decrease upper trap engagement during shoulder abd    Communication/Consultation:     Equipment provided today:    Recommendations/Intent for next treatment session:   Next visit will focus on Manual Therapy Core Stability Pain Science Education Quad strengthening Hip strengthening. Treatment Plan of Care Effective Dates: 2/3/22 to 4/4/2022  (60  days).   Frequency/Duration: 2 times a week for 30 Days       Total Treatment Billable Duration:  45 min  PT Patient Time In/Time Out  Time In: 0900  Time Out: RUDDY Rider    Future Appointments   Date Time Provider Liana Bates   2/14/2022 10:30 AM Odessa Reyez, PT [facility]   2/17/2022  1:00 PM Odessa Reyez, PT [facility]   2/23/2022  1:00 PM Odessa Reyez, PT [facility]   2/25/2022 11:15 AM Odessa Reyez, PT [facility]   2/28/2022 10:30 AM Bere Ngo, PT [facility]

## 2022-02-14 ENCOUNTER — HOSPITAL ENCOUNTER (OUTPATIENT)
Dept: PHYSICAL THERAPY | Age: 57
Discharge: HOME | End: 2022-02-14
Payer: COMMERCIAL

## 2022-02-14 PROCEDURE — 97530 THERAPEUTIC ACTIVITIES: CPT

## 2022-02-14 PROCEDURE — 97110 THERAPEUTIC EXERCISES: CPT

## 2022-02-14 NOTE — PROGRESS NOTES
Georgia Marie  : 1965  Payor: Alfredo Ko / Plan: [place] [facility] / Product Type: PPO /  Sriram Dotter at [address]. [address] [place] [address], [facility], [address] [place] [address]  [phone]   [phone]                                                          Nuris Frankie      OUTPATIENT PHYSICAL THERAPY: Daily Treatment Note 2022   Visit Count:  10    Tx Diagnosis:  Pain in right hip (M25.551)  Psoas tendinitis, right hip (M76.11)      Pre-treatment Symptoms/Complaints:   Pt states that she is doing well with HEP shoulder exercises;backed down to 1 pound to focus on technique. Right hip flexor \"feeling a little pull\" but not limiting activities. Treadmill at home 20 min, 2.5 incline at home without increased symptoms. Overall, states shoulder and hip doing much better      Pain: Initial:0/10  Medications Last Reviewed:  2022     Post Session: 0/10   Updated Objective Findings: Postural assessment: standing--Right ic lower, right psis elevated,   Active movement: standing lumbar flexion--  Hamstring pull,  Supine:  LLE shorter; supine to sit: left still short   pez planus B           GOALS: (Goals have been discussed and agreed upon with patient.)    Short-Term Goals~4 weeks  Goal Met   1. Georgia Marie will be independent with HEP for strength and ROM  [x]? Date:   2. Georgia Marie will tolerate manual therapy/joint mobilizations to increase knee flexion ROM so pt can ambulate stairs and walk with a more normalized gait pattern. [x]? Date:   3. Georgia Marie will participate in core and LE strengthening exercises. [x]? Date:   5. Georgia Marie will participate in static and dynamic balance activities for 5 minutes to help improve proprioception and decrease risk of falls  [x]? Date:    6.  Georgia Marie to increase lower extremity functional scale by 10 points to show improvement in areas of difficulty  []? Date:   5. Aashish Wall will improve MMT B hips LE to >=4+/5 to improve current level of independence and community reintegration. []? Date:             Long Term Goals~8 weeks Goal Met   1. Aashish Wall will be independent in HEP of stretching and strengthening. []? Date:   3. Aashish Wall will ascend/descend 12 steps with reciprocal gait pattern and rail  []? Date:   4. Aashish Wall to increase lower extremity functional scale by 20 points to show improvement in areas of difficulty   []? Date:   Jamil Carroll will be able to stand/walk 10 min or greater without symptom onset. []? Date:   Jamil Carroll will report no symptoms radicular symptoms of right LE.                                       TREATMENT:   THERAPEUTIC EXERCISE: (15 minutes):  Exercises per grid below to improve mobility, strength and balance. Required minimal visual, verbal and manual cues to promote proper body alignment and promote proper body posture. Progressed resistance and complexity of movement as indicated.     [employer] Portal Date:   Date:  1/20/22 Date:  1/24/22 1/27/22 1/31/22 2/3/22 2/7/22 2/10/22 2/14/22   Activity/Exercise Parameters Parameters Parameters         Education HEP, POC, PT goals, anatomy/pathology           Clamshells  X 10, 5 sec B           Prone hip ext with knee flexion  X 10, 5 sec B  Discontinue  X 5 B standing   X 3 quadrant   X 5        1/2 kneel hip flexor stretch  3 x 10 B  X 2 B with cuing          nustep  8 min twin peaks, level 3  10 min  Level 5  10 min level 6 twin peaks          Side stepping   4 laps with med resist band (red) in hallway  2 laps with RTB (4 x 30 ft)  2 laps with RTB (4 x 30 ft)   Yellow band 2 x 80 ft   YTB demo do    Monster steps   Fwd and backward x 2 laps each in hallway  Fwd and backward x 2 laps Fwd and backward x 2 laps   Yellow band 2 x 80 ft  YTB demon do    Sit to stand    15# KB x 15          Step overs    NV TA        Mayorga carry    15# DBs 350 ft          DL     NV        treadmill    3mph, 0 grade, x 11 min 0.5 miles 14 min 2.5-3.0mph   Grade 4-8   10 min 2.5 mph mild incline (pain 2-3/10 groin) 12 min 2.5-3.0  15 min 2.5 -2.8 mph,  Incline at 3.0   t-band shoulder ext             Free weight shoulder         Abduction, scaption, OH -- 10 min work on technique and decreasing UT and levator contraction                               Previous to current therapy : was walking 45 min -1 hr treadmill daily      THERAPEUTIC ACTIVITY: ( 30 minutes): Activities per gid below to improve functional movement related mobility, strength and balance to improve neuro-muscular carryover to daily functional activities for improving patient's quality of life. Required visual, verbal and manual cues to promote proper body alignment and promote proper body posture/mechanics. Progressed resistance and complexity of movement as indicated. Date:  1/27/2022 Date:  1/31/22 Date:  2/3/22 2/7/22 2/14/22   Activity/Exercise Parameters Parameters Parameters     Step over tap In front of mirror fwd tap  Focus: lumbopelvic stability with weight shift and SLS      X 15 min in front of mirror step tap   AROM and posture   Addressing asymmetries of pelvis and LE length  Heel lift admin, instruction and assessed during standing and walking     1/2 kneel in front of mirror OH press with yellow weighted ball   X 5 B    Gait assess     5 min   During treadmill    Weight bearing and non WBing foot-ankle     10 min                                                     MANUAL THERAPY:(  minutes): Joint mobilization, Soft tissue mobilization was utilized and necessary because of the patient's restricted joint motion and restricted motion of soft tissue mobility.         Date  2/7/22    Technique Used Grade  Level # Time(s) Desired effect/clinical reasoning   STM/DTM   scap elevators B  10 mi Decreased tone and discomfort IR/ER right hip (passive)  Right  Not today Increased mobility    Joint mob: PA and inf  IV-  Not today  Decrease stiffness and pain left shoulder                                   *included in HEP      Treatment/Session Summary:    Response to Treatment: Pt improving with increased walking tolerance, decreased right hip, and left shoulder pain. Focused on gait technique while on treadmill (smaller step length, slower scotty than usual work out---increasing time and incline steadily. Also focused on right hip coordination and strength during SL balance- vc's used for alignment. Encouraged pt to practice at home. Communication/Consultation:  Will address goals over next visits to prepare for discharge. Equipment provided today:    Recommendations/Intent for next treatment session:   Next visit will focus on Manual Therapy Core Stability Pain Science Education Quad strengthening Hip strengthening. Treatment Plan of Care Effective Dates: 2/3/22 to 4/4/2022  (60  days).   Frequency/Duration: 2 times a week for 30 Days       Total Treatment Billable Duration:  45 min  PT Patient Time In/Time Out  Time In: 1030  Time Out: 600 Daniel Mendez, PT    Future Appointments   Date Time Provider Liana Bates   2/17/2022  1:00 PM [facility]   2/23/2022  1:00 PM Gloria Francis PT [facility]   2/25/2022 11:15 AM RUDDY [facility]   2/28/2022 10:30 AM Yung Marie PT [facility]

## 2022-02-17 ENCOUNTER — HOSPITAL ENCOUNTER (OUTPATIENT)
Dept: PHYSICAL THERAPY | Age: 57
Discharge: HOME | End: 2022-02-17
Payer: COMMERCIAL

## 2022-02-17 PROCEDURE — 97110 THERAPEUTIC EXERCISES: CPT

## 2022-02-17 NOTE — PROGRESS NOTES
William Casiano  : 1965  Payor: Nahomy Loving / Plan: [facility] / Product Type: PPO /  [address]. [address] [place] [address], [facility], [address] [place] [address] Road  [phone]   [phone]                                                          Ally Postin      OUTPATIENT PHYSICAL THERAPY: Daily Treatment Note 2022   Visit Count:  11    Tx Diagnosis:  Pain in right hip (M25.551)  Psoas tendinitis, right hip (M76.11)      Pre-treatment Symptoms/Complaints:    Walking in neighborhood 25-30 min no soreness   Doing HEP-- only thing that makes sore is step tap exercise. Pain: Initial:0/10  Medications Last Reviewed:  2022     Post Session: 0/10           THERAPEUTIC EXERCISE: (40 minutes):  Exercises per grid below to improve mobility, strength and balance. Required minimal visual, verbal and manual cues to promote proper body alignment and promote proper body posture. Progressed resistance and complexity of movement as indicated.     "[employer] Portal Date:   Date:  22 Date:  1/24/22 1/27/22 1/31/22 2/3/22 2/7/22 2/10/22 2/14/22 2/17/22   Activity/Exercise Parameters Parameters Parameters          Education HEP, POC, PT goals, anatomy/pathology            Clamshells  X 10, 5 sec B            Prone hip ext with knee flexion  X 10, 5 sec B  Discontinue  X 5 B standing   X 3 quadrant   X 5         1/2 kneel hip flexor stretch  3 x 10 B  X 2 B with cuing           nustep  8 min twin peaks, level 3  10 min  Level 5  10 min level 6 twin peaks           Side stepping   4 laps with med resist band (red) in hallway  2 laps with RTB (4 x 30 ft)  2 laps with RTB (4 x 30 ft)   Yellow band 2 x 80 ft   YTB demo do  Red band 2 x 10    Monster steps   Fwd and backward x 2 laps each in hallway  Fwd and backward x 2 laps Fwd and backward x 2 laps   Yellow band 2 x 80 ft  YTB demon do  Red band 3 x 10    Sit to stand    15# KB x 15           Step overs    NV TA         Mayorga carry    15# DBs 350 ft           DL     NV         treadmill    3mph, 0 grade, x 11 min 0.5 miles 14 min 2.5-3.0mph   Grade 4-8   10 min 2.5 mph mild incline (pain 2-3/10 groin) 12 min 2.5-3.0  15 min  mph,  Incline at 3.0 X 10 min 2.5 -2.8 mph   t-band shoulder ext              Free weight shoulder         Abduction, scaption, OH -- 10 min work on technique and decreasing UT and levator contraction   1# while tall kneeling   Stairs           5 laps no rail    1/2 knee OH weighted reach           X 5 min in front of mirror      Previous to current therapy : was walking 45 min -1 hr treadmill daily      THERAPEUTIC ACTIVITY: ( 0 minutes): Activities per gid below to improve functional movement related mobility, strength and balance to improve neuro-muscular carryover to daily functional activities for improving patient's quality of life. Required visual, verbal and manual cues to promote proper body alignment and promote proper body posture/mechanics. Progressed resistance and complexity of movement as indicated. Date:  1/27/2022 Date:  1/31/22 Date:  2/3/22 2/7/22 2/14/22    Activity/Exercise Parameters Parameters Parameters      Step over tap In front of mirror fwd tap  Focus: lumbopelvic stability with weight shift and SLS      X 15 min in front of mirror step tap    AROM and posture   Addressing asymmetries of pelvis and LE length  Heel lift admin, instruction and assessed during standing and walking     1/2 kneel in front of mirror OH press with yellow weighted ball   X 5 B     Gait assess     5 min   During treadmill     Weight bearing and non WBing foot-ankle     10 min                                                            MANUAL THERAPY:(  minutes): Joint mobilization, Soft tissue mobilization was utilized and necessary because of the patient's restricted joint motion and restricted motion of soft tissue mobility.         Date  2/7/22 Technique Used Grade  Level # Time(s) Desired effect/clinical reasoning   STM/DTM   scap elevators B  10 mi Decreased tone and discomfort   IR/ER right hip (passive)  Right  Not today Increased mobility    Joint mob: PA and inf  IV-  Not today  Decrease stiffness and pain left shoulder                                   *included in HEP      Treatment/Session Summary:    Response to Treatment: Focusing on refining and progressing HEP. Prep for discharge. See discharge summary for further details and measurement          Communication/Consultation:     Equipment provided today:        Treatment Plan of Care Effective Dates: 2/3/22 to 4/4/2022  (60  days). Frequency/Duration: 2 times a week for 30 Days       Total Treatment Billable Duration:  40 min  PT Patient Time In/Time Out  Time In: 1300  Time Out: [address], PT    No future appointments.

## 2022-02-17 NOTE — THERAPY DISCHARGE
Thomas Nipper  : 1965  Primary: [facility]*  Secondary:  [facility] [address] [place] [address], [address] [facility]  [phone]   [phone]         OUTPATIENT PHYSICAL THERAPY:Discharge 2022    ICD-10: Treatment Diagnosis:  Pain in right hip (M25.551)  Psoas tendinitis, right hip (M76.11)    Precautions/Allergies:   Codeine and Percocet [oxycodone-acetaminophen]   MD Orders: Eval and Treat  MEDICAL/REFERRING DIAGNOSIS:  Strain of right groin [S76.211A]   Right hip pain M25.551  Left shoulder pain M25.512    DATE OF ONSET: 3 weeks prior to initial evaluation   REFERRING PHYSICIAN: Yari Killian MD  RETURN PHYSICIAN APPOINTMENT: TBA      ASSESSMENT:  Ms. Estrellita Fischer has attended 11 visits for treatment of right hip/groin and shoulder pain. She has demonstrated S/S are consistent with iliopsoas strain, with alignment abnormalities such as leg length discrepancy and B pes planus. Shewas discharge from outpatient therapy on 22 due to has meeting therapy goals below. Thomas Nipper will benefit from skilled PT (medically necessary) in order to address above deficits affecting participation in basic ADLs and overall functional tolerance. GOALS: (Goals have been discussed and agreed upon with patient.)  Short-Term Goals~4 weeks  Goal Met   Thomas Nipper will be independent with HEP for strength and ROM   [x]    Thomas Nipper will tolerate manual therapy/joint mobilizations to increase knee flexion ROM so pt can ambulate stairs and walk with a more normalized gait pattern. [x]    Thomas Nipper will participate in core and LE strengthening exercises.    [x]    Thomas Nipper will participate in static and dynamic balance activities for 5 minutes to help improve proprioception and decrease risk of falls   [x]    Thomas Nipper to increase lower extremity functional scale by 10 points to show improvement in areas of difficulty   [x]    Lance Mccullough will improve MMT B hips LE to >=4+/5 to improve current level of independence and community reintegration. [x]       Long Term Goals~8 weeks Goal Met   Lance Mccullough will be independent in HEP of stretching and strengthening. [x]    Lance Mccullough will ascend/descend 12 steps with reciprocal gait pattern and rail   [x]    Lance Mccullough to increase lower extremity functional scale by 20 points to show improvement in areas of difficulty    [x]    Lance Mccullough will be able to stand/walk 10 min or greater without symptom onset. [x]   Lance Mccullough will report no symptoms radicular symptoms of right LE. [x]   Lance Mccullough will report no pain of left shoulder with all AROM and daily tasks. [x]     [x]       Outcome Measure: Tool Used: Lower Extremity Functional Scale (LEFS)    Score:  Initial: 42/80 Most Recent: 76/80 (Date: 2/17/22 )   Interpretation of Score: 20 questions each scored on a 5 point scale with 0 representing \"extreme difficulty or unable to perform\" and 4 representing \"no difficulty\". The lower the score, the greater the functional disability. 80/80 represents no disability. Minimal detectable change is 9 points.       Thank you for this referral,  Jorge L Olvera, PT               EXAMINATION:   Observation/Orthostatic Postural Assessment:   Gait: symmetrical, no LOB      Palpation:    Mild Tenderness of iliopsoas     MMT        Joint: Eval Date: 1/10/21  Discharge Date: 2/17/22    Strength  RIGHT LEFT RIGHT LEFT   Knee Extension 4+/5 4+/5  5/5 5/5   Knee Flexion 4+/5 4+/5 5/5 5/5   Hip abduction  4/5 4/5 5/5 5/5   Hip Flexion 4/5 4/5  5/5 5/5            ROM:  · LE: no pain with right hip quadrant   · Lumbar AROM WNL all planes     Functional Mobility:  Sit to Stand: normal mechanics, no use UE  Stairs: 12-14 steps, no UE assist    Walking: subjectively able to walk 30 min on treadmill without pain                  Observed x 13 min without symptoms in clinic       See treatment note for associated treatment provided today. No future appointments.       Rafael Al, PT

## 2022-02-23 ENCOUNTER — APPOINTMENT (OUTPATIENT)
Dept: PHYSICAL THERAPY | Age: 57
End: 2022-02-23
Payer: COMMERCIAL

## 2022-02-25 ENCOUNTER — APPOINTMENT (OUTPATIENT)
Dept: PHYSICAL THERAPY | Age: 57
End: 2022-02-25
Payer: COMMERCIAL

## 2022-02-28 ENCOUNTER — APPOINTMENT (OUTPATIENT)
Dept: PHYSICAL THERAPY | Age: 57
End: 2022-02-28
Payer: COMMERCIAL

## 2022-04-14 ENCOUNTER — TRANSCRIBE ORDER (OUTPATIENT)
Dept: SCHEDULING | Age: 57
End: 2022-04-14

## 2022-04-14 DIAGNOSIS — N63.11 UNSPECIFIED LUMP IN THE RIGHT BREAST, UPPER OUTER QUADRANT: Primary | ICD-10-CM

## 2022-04-19 ENCOUNTER — TRANSCRIBE ORDER (OUTPATIENT)
Dept: SCHEDULING | Age: 57
End: 2022-04-19

## 2022-04-19 DIAGNOSIS — N63.11 UNSPECIFIED LUMP IN THE RIGHT BREAST, UPPER OUTER QUADRANT: Primary | ICD-10-CM

## 2022-04-20 ENCOUNTER — TRANSCRIBE ORDER (OUTPATIENT)
Dept: SCHEDULING | Age: 57
End: 2022-04-20

## 2022-04-20 DIAGNOSIS — N63.11 BREAST LUMP ON RIGHT SIDE AT 10 O'CLOCK POSITION: Primary | ICD-10-CM

## 2022-04-25 ENCOUNTER — HOSPITAL ENCOUNTER (OUTPATIENT)
Dept: MAMMOGRAPHY | Age: 57
Discharge: HOME | End: 2022-04-25
Attending: FAMILY MEDICINE
Payer: COMMERCIAL

## 2022-04-25 DIAGNOSIS — N63.11 BREAST LUMP ON RIGHT SIDE AT 10 O'CLOCK POSITION: ICD-10-CM

## 2022-04-25 PROCEDURE — 76642 ULTRASOUND BREAST LIMITED: CPT

## 2022-04-25 PROCEDURE — 77066 DX MAMMO INCL CAD BI: CPT

## 2023-02-20 NOTE — PROGRESS NOTES
Negar Valladares  : 1965  Payor: Leif Moffett / Plan: [place] [facility] / Product Type: O /  [id number] [facility] at [address] [place] [address]. [address] [place] [address], [address] [place] [address]  [phone]   [phone]                                                          Sofi Guzman MD      OUTPATIENT PHYSICAL THERAPY: Daily Treatment Note 2020   Visit Count:  1    Tx Diagnosis:  Pain in right elbow (M25.521)Stiffness of right elbow, not elsewhere classified (M25.621)Other specified disorders of tendon, right elbow (Y87.247)    Pre-treatment Symptoms/Complaints:  See Initial Eval Dated 20 for more details. Pain: Initial:3/10 Post Session: 3/10   Medications Last Reviewed:  2020     Updated Objective Findings: See Initial Eval for more details. TREATMENT:   THERAPEUTIC EXERCISE: (10 minutes):  Exercises per grid below to improve mobility, strength and balance. Required minimal visual, verbal and manual cues to promote proper body alignment and promote proper body posture. Progressed resistance and complexity of movement as indicated. Date:  2020 Date:   Date:     Activity/Exercise Parameters Parameters Parameters   Education HEP, POC, PT goals, anatomy/pathology     Bicep stretch 3 x 20 sec      Wrist flex stretch  3 x 20 sec      Wrist ext stretch  3 x 20 sec      rows nv     Bicep curls  nv     Wrist pron/sup nv           THERAPEUTIC ACTIVITY: ( 0 minutes): Activities per gid below to improve functional movement related mobility, strength and balance to improve neuro-muscular carryover to daily functional activities for improving patient's quality of life. Required visual, verbal and manual cues to promote proper body alignment and promote proper body posture/mechanics. Progressed resistance and complexity of movement as indicated.      Date:  2020 Date:   Date:     Activity/Exercise Parameters Parameters Parameters Render In Strict Bullet Format?: No Detail Level: Zone Stretching                                                                            MANUAL THERAPY: (15 minutes): Joint mobilization, Soft tissue mobilization was utilized and necessary because of the patient's restricted joint motion and restricted motion of soft tissue mobility. Date  12/11/2020    Technique Used Grade  Level # Time(s) Effect while being performed   DTM/STM  Right bicep, post-lat forearm X 10 min  Decrease triggerpoints/tension in muscle    Joint mobs AP/PA's grade III Right prox Radial head 3 bouts each  Decreased joint stiffness   PROM Flex/ext/pron/sup right elbow  3 bouts Decreased joint stiffness                                             HEP Log Date 1. Bicep stretch 12/11/2020   2. Wrist ext stretch 12/11/2020   3.wrist flex stretch  12/11/2020   4.    5.           [employer] Portal  Treatment/Session Summary:    Response to Treatment: Pt demonstrated understanding of POC and initial HEP. No increase in pain or adverse reactions. Communication/Consultation:  POC, HEP, PT goals, Faxed initial evaluation to MD.   Equipment provided today: HEP Handout     Recommendations/Intent for next treatment session:   Next visit will focus on Manual Therapy soft tissue mobilization strengtheing HEP. Treatment Plan of Care Effective Dates: 12/11/2020 TO 2/9/2021 (60 days).   Frequency/Duration: 2 times a week for 60 Days       Total Treatment Billable Duration:   25  Rx plus Eval   PT Patient Time In/Time Out  Time In: 0900  Time Out: Rhea Talbot, PT    Future Appointments   Date Time Provider Liana Bates   12/17/2020  9:00 AM Jose Zheng, PT [facility]   12/24/2020  8:15 AM Margarita Man, RT [facility]   12/31/2020  9:00 AM [facility]   1/7/2021  9:00 AM Shaye Hall[facility] Continue Regimen: ketoconazole 2 % shampoo: Lather into scalp with each wash, let sit 2-3 minutes then rinse.\\nclobetasol 0.05 % scalp solution: Apply a few scattered drops into scalp QHS  and gently massage in. Plan: Will reasses treatment regimen when biopsy result is reviewed and will change accordingly